# Patient Record
Sex: MALE | Race: WHITE | NOT HISPANIC OR LATINO | Employment: FULL TIME | URBAN - METROPOLITAN AREA
[De-identification: names, ages, dates, MRNs, and addresses within clinical notes are randomized per-mention and may not be internally consistent; named-entity substitution may affect disease eponyms.]

---

## 2019-07-09 ENCOUNTER — CLINICAL SUPPORT (OUTPATIENT)
Dept: URGENT CARE | Facility: CLINIC | Age: 24
End: 2019-07-09

## 2019-07-09 PROCEDURE — 99499 UNLISTED E&M SERVICE: CPT | Mod: S$GLB,,, | Performed by: EMERGENCY MEDICINE

## 2019-07-09 PROCEDURE — 99499 COAST GUARD PHYSICAL: ICD-10-PCS | Mod: S$GLB,,, | Performed by: EMERGENCY MEDICINE

## 2019-12-03 ENCOUNTER — CLINICAL SUPPORT (OUTPATIENT)
Dept: URGENT CARE | Facility: CLINIC | Age: 24
End: 2019-12-03
Payer: COMMERCIAL

## 2019-12-03 VITALS
HEART RATE: 63 BPM | DIASTOLIC BLOOD PRESSURE: 85 MMHG | RESPIRATION RATE: 16 BRPM | SYSTOLIC BLOOD PRESSURE: 145 MMHG | TEMPERATURE: 98 F | WEIGHT: 198 LBS | OXYGEN SATURATION: 98 %

## 2019-12-03 DIAGNOSIS — R50.9 FEVER, UNSPECIFIED FEVER CAUSE: Primary | ICD-10-CM

## 2019-12-03 DIAGNOSIS — J01.90 ACUTE NON-RECURRENT SINUSITIS, UNSPECIFIED LOCATION: ICD-10-CM

## 2019-12-03 DIAGNOSIS — J02.9 PHARYNGITIS, UNSPECIFIED ETIOLOGY: ICD-10-CM

## 2019-12-03 LAB
CTP QC/QA: YES
CTP QC/QA: YES
FLUAV AG NPH QL: NEGATIVE
FLUBV AG NPH QL: NEGATIVE
S PYO RRNA THROAT QL PROBE: NEGATIVE

## 2019-12-03 PROCEDURE — 96372 PR INJECTION,THERAP/PROPH/DIAG2ST, IM OR SUBCUT: ICD-10-PCS | Mod: S$GLB,,, | Performed by: NURSE PRACTITIONER

## 2019-12-03 PROCEDURE — 99214 OFFICE O/P EST MOD 30 MIN: CPT | Mod: 25,S$GLB,, | Performed by: NURSE PRACTITIONER

## 2019-12-03 PROCEDURE — 99214 PR OFFICE/OUTPT VISIT, EST, LEVL IV, 30-39 MIN: ICD-10-PCS | Mod: 25,S$GLB,, | Performed by: NURSE PRACTITIONER

## 2019-12-03 PROCEDURE — 87880 STREP A ASSAY W/OPTIC: CPT | Mod: QW,,, | Performed by: NURSE PRACTITIONER

## 2019-12-03 PROCEDURE — 96372 THER/PROPH/DIAG INJ SC/IM: CPT | Mod: S$GLB,,, | Performed by: NURSE PRACTITIONER

## 2019-12-03 PROCEDURE — 87804 INFLUENZA ASSAY W/OPTIC: CPT | Mod: QW,,, | Performed by: NURSE PRACTITIONER

## 2019-12-03 PROCEDURE — 87880 POCT RAPID STREP A: ICD-10-PCS | Mod: QW,,, | Performed by: NURSE PRACTITIONER

## 2019-12-03 PROCEDURE — 87804 POCT INFLUENZA A/B: ICD-10-PCS | Mod: 59,QW,, | Performed by: NURSE PRACTITIONER

## 2019-12-03 RX ORDER — AMOXICILLIN 875 MG/1
875 TABLET, FILM COATED ORAL 2 TIMES DAILY
Qty: 14 TABLET | Refills: 0 | Status: SHIPPED | OUTPATIENT
Start: 2019-12-03 | End: 2019-12-10

## 2019-12-03 RX ORDER — DEXAMETHASONE SODIUM PHOSPHATE 4 MG/ML
8 INJECTION, SOLUTION INTRA-ARTICULAR; INTRALESIONAL; INTRAMUSCULAR; INTRAVENOUS; SOFT TISSUE
Status: COMPLETED | OUTPATIENT
Start: 2019-12-03 | End: 2019-12-03

## 2019-12-03 RX ORDER — FLUTICASONE PROPIONATE 50 MCG
2 SPRAY, SUSPENSION (ML) NASAL DAILY
Qty: 15.8 ML | Refills: 0 | Status: SHIPPED | OUTPATIENT
Start: 2019-12-03

## 2019-12-03 RX ORDER — CETIRIZINE HYDROCHLORIDE 10 MG/1
10 TABLET ORAL DAILY
Qty: 30 TABLET | Refills: 0 | Status: SHIPPED | OUTPATIENT
Start: 2019-12-03 | End: 2023-02-03

## 2019-12-03 RX ADMIN — DEXAMETHASONE SODIUM PHOSPHATE 8 MG: 4 INJECTION, SOLUTION INTRA-ARTICULAR; INTRALESIONAL; INTRAMUSCULAR; INTRAVENOUS; SOFT TISSUE at 10:12

## 2019-12-03 NOTE — PROGRESS NOTES
Subjective: post nasal drip, stuffy nose, runny nose, sinus pressure       Patient ID: Jose Ibrahim is a 24 y.o. male.    Vitals:  weight is 89.8 kg (198 lb). His temperature is 97.7 °F (36.5 °C). His blood pressure is 145/85 (abnormal) and his pulse is 63. His respiration is 16 and oxygen saturation is 98%.     Chief Complaint: Sinus Problem (post nasal drip, runny nose, sinus pressure)    Patient complains of sore throat, fever and chills that began last night. Denies difficulty swallowing.     Sinus Problem   This is a new problem. The current episode started in the past 7 days. Associated symptoms include chills, congestion, sinus pressure and a sore throat. Pertinent negatives include no coughing, headaches or shortness of breath.       Constitution: Positive for chills and fever. Negative for fatigue.   HENT: Positive for congestion, sinus pressure and sore throat.    Neck: Negative for painful lymph nodes.   Cardiovascular: Negative for chest pain and leg swelling.   Eyes: Negative for double vision and blurred vision.   Respiratory: Negative for cough and shortness of breath.    Gastrointestinal: Negative for abdominal pain, nausea, vomiting and diarrhea.   Genitourinary: Negative for dysuria, frequency and urgency.   Musculoskeletal: Negative for joint pain, joint swelling, muscle cramps and muscle ache.   Skin: Negative for color change, pale and rash.   Allergic/Immunologic: Negative for seasonal allergies.   Neurological: Negative for dizziness, history of vertigo, light-headedness, passing out and headaches.   Hematologic/Lymphatic: Negative for swollen lymph nodes, easy bruising/bleeding and history of blood clots. Does not bruise/bleed easily.   Psychiatric/Behavioral: Negative for nervous/anxious, sleep disturbance and depression. The patient is not nervous/anxious.        Objective:      Physical Exam   Constitutional: He is oriented to person, place, and time. Vital signs are normal. He appears  well-developed and well-nourished. He is cooperative.  Non-toxic appearance. He does not have a sickly appearance. He does not appear ill. No distress.   HENT:   Head: Normocephalic and atraumatic.   Right Ear: Hearing, tympanic membrane, external ear and ear canal normal.   Left Ear: Hearing, tympanic membrane, external ear and ear canal normal.   Nose: Mucosal edema and rhinorrhea present. No nasal deformity. No epistaxis. Right sinus exhibits no maxillary sinus tenderness and no frontal sinus tenderness. Left sinus exhibits no maxillary sinus tenderness and no frontal sinus tenderness.   Mouth/Throat: Uvula is midline and mucous membranes are normal. No trismus in the jaw. Normal dentition. No uvula swelling. Posterior oropharyngeal erythema present.   Eyes: Conjunctivae and lids are normal. Right eye exhibits no discharge. Left eye exhibits no discharge. No scleral icterus.   Neck: Trachea normal, normal range of motion, full passive range of motion without pain and phonation normal. Neck supple.   Cardiovascular: Normal rate, regular rhythm, normal heart sounds, intact distal pulses and normal pulses.   Pulmonary/Chest: Effort normal and breath sounds normal. No respiratory distress.   Abdominal: Soft. Normal appearance and bowel sounds are normal. He exhibits no distension, no pulsatile midline mass and no mass. There is no tenderness.   Musculoskeletal: Normal range of motion. He exhibits no edema or deformity.   Neurological: He is alert and oriented to person, place, and time. He exhibits normal muscle tone. Coordination normal. GCS eye subscore is 4. GCS verbal subscore is 5. GCS motor subscore is 6.   Skin: Skin is warm, dry, intact, not diaphoretic and not pale.   Psychiatric: He has a normal mood and affect. His speech is normal and behavior is normal. Judgment and thought content normal. Cognition and memory are normal.   Nursing note and vitals reviewed.        Assessment:       1. Fever, unspecified  fever cause    2. Pharyngitis, unspecified etiology    3. Acute non-recurrent sinusitis, unspecified location        Plan:       Rapid strep negative. Influenza negative.       Fever, unspecified fever cause  -     POCT rapid strep A  -     POCT Influenza A/B    Pharyngitis, unspecified etiology    Acute non-recurrent sinusitis, unspecified location    Other orders  -     dexamethasone injection 8 mg  -     amoxicillin (AMOXIL) 875 MG tablet; Take 1 tablet (875 mg total) by mouth 2 (two) times daily. for 7 days  Dispense: 14 tablet; Refill: 0  -     cetirizine (ZYRTEC) 10 MG tablet; Take 1 tablet (10 mg total) by mouth once daily.  Dispense: 30 tablet; Refill: 0  -     fluticasone propionate (FLONASE) 50 mcg/actuation nasal spray; 2 sprays (100 mcg total) by Each Nostril route once daily.  Dispense: 15.8 mL; Refill: 0

## 2019-12-03 NOTE — PATIENT INSTRUCTIONS

## 2020-02-10 ENCOUNTER — OFFICE VISIT (OUTPATIENT)
Dept: ORTHOPEDICS | Facility: CLINIC | Age: 25
End: 2020-02-10
Payer: COMMERCIAL

## 2020-02-10 VITALS
HEART RATE: 76 BPM | HEIGHT: 73 IN | WEIGHT: 198 LBS | SYSTOLIC BLOOD PRESSURE: 122 MMHG | DIASTOLIC BLOOD PRESSURE: 74 MMHG | BODY MASS INDEX: 26.24 KG/M2

## 2020-02-10 DIAGNOSIS — S39.012A STRAIN OF LUMBAR REGION, INITIAL ENCOUNTER: Primary | ICD-10-CM

## 2020-02-10 DIAGNOSIS — M54.59 LUMBAR FACET JOINT PAIN: ICD-10-CM

## 2020-02-10 PROCEDURE — 3008F PR BODY MASS INDEX (BMI) DOCUMENTED: ICD-10-PCS | Mod: S$GLB,,, | Performed by: ORTHOPAEDIC SURGERY

## 2020-02-10 PROCEDURE — 99203 OFFICE O/P NEW LOW 30 MIN: CPT | Mod: 25,S$GLB,, | Performed by: ORTHOPAEDIC SURGERY

## 2020-02-10 PROCEDURE — 3008F BODY MASS INDEX DOCD: CPT | Mod: S$GLB,,, | Performed by: ORTHOPAEDIC SURGERY

## 2020-02-10 PROCEDURE — 99203 PR OFFICE/OUTPT VISIT, NEW, LEVL III, 30-44 MIN: ICD-10-PCS | Mod: 25,S$GLB,, | Performed by: ORTHOPAEDIC SURGERY

## 2020-02-10 NOTE — PROGRESS NOTES
Subjective:       Patient ID: Jose Ibrahim is a 24 y.o. male.    Chief Complaint: Pain of the Lumbar Spine (Lumbar pain for about 3 months, states he was lifting a pallet from a truck knee level when he felt a pop. He went to the chiropractor a few times but no relief. Back feels tight. ROM limited when bending over. Intermittent pain on lateral side RT hip)      History of Present Illness  [age] old man injured his lower back lifting a Pallet and has had some persistent back pain that radiates to his right buttock no bowel or bladder incontinence has seen a chiropractor takes over-the-counter anti-inflammatories.  No prior history of similar symptoms    Current Medications  Current Outpatient Medications   Medication Sig Dispense Refill    cetirizine (ZYRTEC) 10 MG tablet Take 1 tablet (10 mg total) by mouth once daily. 30 tablet 0    epinephrine (EPIPEN) 0.3 mg/0.3 mL PnIj No Sig Provided      fluticasone propionate (FLONASE) 50 mcg/actuation nasal spray 2 sprays (100 mcg total) by Each Nostril route once daily. 15.8 mL 0    mometasone (ASMANEX TWISTHALER) 220 mcg (60 doses) AePB Inhale 1 puff into the lungs once daily. 1-2 times daily 1 each 3     Current Facility-Administered Medications   Medication Dose Route Frequency Provider Last Rate Last Dose    Allergy Mix   Subcutaneous 1 time in Clinic/HOD Aurora Murphy MD        Allergy Mix   Subcutaneous 1 time in Clinic/HOD Aurora Murphy MD           Allergies  Review of patient's allergies indicates:   Allergen Reactions    No known drug allergies        Past Medical History  Past Medical History:   Diagnosis Date    Environmental allergies     RAD (reactive airway disease)     RAD (reactive airway disease)        Surgical History  Past Surgical History:   Procedure Laterality Date    CIRCUMCISION, PRIMARY         Family History:   Family History   Problem Relation Age of Onset    Allergic rhinitis Father     Allergies Sister      Angioedema Neg Hx     Asthma Neg Hx     Atopy Neg Hx     Eczema Neg Hx     Immunodeficiency Neg Hx     Urticaria Neg Hx        Social History:   Social History     Socioeconomic History    Marital status: Single     Spouse name: Not on file    Number of children: Not on file    Years of education: Not on file    Highest education level: Not on file   Occupational History    Not on file   Social Needs    Financial resource strain: Not on file    Food insecurity:     Worry: Not on file     Inability: Not on file    Transportation needs:     Medical: Not on file     Non-medical: Not on file   Tobacco Use    Smoking status: Never Smoker   Substance and Sexual Activity    Alcohol use: No    Drug use: No    Sexual activity: Not on file   Lifestyle    Physical activity:     Days per week: Not on file     Minutes per session: Not on file    Stress: Not on file   Relationships    Social connections:     Talks on phone: Not on file     Gets together: Not on file     Attends Scientology service: Not on file     Active member of club or organization: Not on file     Attends meetings of clubs or organizations: Not on file     Relationship status: Not on file   Other Topics Concern    Not on file   Social History Narrative    SOC. HX:  Lives w/ Mom, Dad, Sister. NO Smokers. + Pets -- 1 dog, 1 cat (Inside). 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        Allergic to cats dogs and dust.  Has received immune therapy to cats and dust.  Will begin receiving immune therapy to dog dander. Has cats and dogs at home but Family does not want to eliminate that exposure.       Hospitalization/Major Diagnostic Procedure:     Review of Systems     General/Constitutional:  Chills denies. Fatigue denies. Fever denies. Weight gain denies. Weight loss denies.    Respiratory:  Shortness of breath denies.    Cardiovascular:  Chest pain denies.    Gastrointestinal:  Constipation denies. Diarrhea denies. Nausea denies. Vomiting  denies.     Hematology:  Easy bruising denies. Prolonged bleeding denies.     Genitourinary:  Frequent urination denies. Pain in lower back denies. Painful urination denies.     Musculoskeletal:  See HPI for details    Skin:  Rash denies.    Neurologic:  Dizziness denies. Gait abnormalities denies. Seizures denies. Tingling/Numbess denies.    Psychiatric:  Anxiety denies. Depressed mood denies.     Objective:   Vital Signs:   Vitals:    02/10/20 0837   BP: 122/74   Pulse: 76        Physical Exam      General Examination:     Constitutional: The patient is alert and oriented to lace person and time. Mood is pleasant.     Head/Face: Normal facial features normal eyebrows    Eyes: Normal extraocular motion bilaterally    Lungs: Respirations are equal and unlabored    Gait is coordinated.    Cardiovascular: There are no swelling or varicosities present.    Lymphatic: Negative for adenopathy    Skin: Normal    Neurological: Level of consciousness normal. Oriented to place person and time and situation    Psychiatric: Oriented to time place person and situation    Moderate tenderness lumbosacral junction midline the paraspinous muscles no spasm range of motion mildly limited tenderness over the right posterior iliac spine.  Hip and knee range of motion normal straight-leg-raising negative. Neurovascular status normal.    XRAY Report/ Interpretation :  AP and lateral x-rays of lumbar spine will performed today. Indications low back pain. Findings:  X-rays performed February 10 were personally reviewed AP and lateral views appeared to be normal no signs of any occult fractures normal disc space maintenance.      Assessment:       1. Strain of lumbar region, initial encounter    2. Lumbar facet joint pain        Plan:       Jose was seen today for pain.    Diagnoses and all orders for this visit:    Strain of lumbar region, initial encounter  -     X-Ray Lumbar Spine Ap And Lateral  -     X-Ray Pelvis Routine AP    Lumbar  facet joint pain         No follow-ups on file.    Due to the duration of his symptoms which are unusual in a man his age I suggested is this point time he have an MRI study of the lumbar spine for further evaluation of his present low back pain    This note was created using Dragon voice recognition software that occasionally misinterpreted phrases or words.     No complaints

## 2020-02-14 ENCOUNTER — HOSPITAL ENCOUNTER (OUTPATIENT)
Dept: RADIOLOGY | Facility: HOSPITAL | Age: 25
Discharge: HOME | End: 2020-02-14
Attending: ORTHOPAEDIC SURGERY
Payer: COMMERCIAL

## 2020-02-14 DIAGNOSIS — M54.59 LUMBAR FACET JOINT PAIN: ICD-10-CM

## 2020-02-14 DIAGNOSIS — S39.012A STRAIN OF LUMBAR REGION, INITIAL ENCOUNTER: ICD-10-CM

## 2020-02-14 PROCEDURE — 72148 MRI LUMBAR SPINE W/O DYE: CPT | Mod: TC,PO

## 2020-02-19 ENCOUNTER — OFFICE VISIT (OUTPATIENT)
Dept: ORTHOPEDICS | Facility: CLINIC | Age: 25
End: 2020-02-19
Payer: COMMERCIAL

## 2020-02-19 VITALS
WEIGHT: 198 LBS | BODY MASS INDEX: 26.24 KG/M2 | SYSTOLIC BLOOD PRESSURE: 118 MMHG | DIASTOLIC BLOOD PRESSURE: 78 MMHG | HEIGHT: 73 IN | HEART RATE: 74 BPM

## 2020-02-19 DIAGNOSIS — M47.816 LUMBAR FACET ARTHROPATHY: ICD-10-CM

## 2020-02-19 DIAGNOSIS — M51.36 DISC DEGENERATION, LUMBAR: Primary | ICD-10-CM

## 2020-02-19 PROCEDURE — 99214 PR OFFICE/OUTPT VISIT, EST, LEVL IV, 30-39 MIN: ICD-10-PCS | Mod: S$GLB,,, | Performed by: ORTHOPAEDIC SURGERY

## 2020-02-19 PROCEDURE — 99214 OFFICE O/P EST MOD 30 MIN: CPT | Mod: S$GLB,,, | Performed by: ORTHOPAEDIC SURGERY

## 2020-02-19 PROCEDURE — 3008F PR BODY MASS INDEX (BMI) DOCUMENTED: ICD-10-PCS | Mod: S$GLB,,, | Performed by: ORTHOPAEDIC SURGERY

## 2020-02-19 PROCEDURE — 3008F BODY MASS INDEX DOCD: CPT | Mod: S$GLB,,, | Performed by: ORTHOPAEDIC SURGERY

## 2020-02-19 RX ORDER — TIZANIDINE 4 MG/1
4 TABLET ORAL EVERY 6 HOURS PRN
Qty: 90 TABLET | Refills: 2 | Status: SHIPPED | OUTPATIENT
Start: 2020-02-19 | End: 2020-05-19

## 2020-02-19 RX ORDER — MELOXICAM 15 MG/1
15 TABLET ORAL DAILY
Qty: 30 TABLET | Refills: 2 | Status: SHIPPED | OUTPATIENT
Start: 2020-02-19 | End: 2022-07-07

## 2020-02-19 NOTE — PROGRESS NOTES
Subjective:       Patient ID: Jose Ibrahim is a 24 y.o. male.    Chief Complaint: Pain of the Lumbar Spine (Lumbar MRI results, pain level is low)      History of Present Illness  Patient is here follow-up for midline lumbar lumbosacral pain with occasional radiation into the right buttock area with increased activity.  This is secondary to work injury greater than 3 months ago as previously documented.  He has an updated lumbar MRI to review.  Underwent a structured course of chiropractic treatment which did not help his symptoms    Current Medications  Current Outpatient Medications   Medication Sig Dispense Refill    epinephrine (EPIPEN) 0.3 mg/0.3 mL PnIj No Sig Provided      fluticasone propionate (FLONASE) 50 mcg/actuation nasal spray 2 sprays (100 mcg total) by Each Nostril route once daily. 15.8 mL 0    mometasone (ASMANEX TWISTHALER) 220 mcg (60 doses) AePB Inhale 1 puff into the lungs once daily. 1-2 times daily 1 each 3    cetirizine (ZYRTEC) 10 MG tablet Take 1 tablet (10 mg total) by mouth once daily. 30 tablet 0     Current Facility-Administered Medications   Medication Dose Route Frequency Provider Last Rate Last Dose    Allergy Mix   Subcutaneous 1 time in Clinic/HOD Aurora Murphy MD        Allergy Mix   Subcutaneous 1 time in Clinic/HOD Aurora Murphy MD           Allergies  Review of patient's allergies indicates:   Allergen Reactions    No known drug allergies        Past Medical History  Past Medical History:   Diagnosis Date    Environmental allergies     RAD (reactive airway disease)     RAD (reactive airway disease)        Surgical History  Past Surgical History:   Procedure Laterality Date    CIRCUMCISION, PRIMARY         Family History:   Family History   Problem Relation Age of Onset    Allergic rhinitis Father     Allergies Sister     Angioedema Neg Hx     Asthma Neg Hx     Atopy Neg Hx     Eczema Neg Hx     Immunodeficiency Neg Hx     Urticaria Neg Hx         Social History:   Social History     Socioeconomic History    Marital status: Single     Spouse name: Not on file    Number of children: Not on file    Years of education: Not on file    Highest education level: Not on file   Occupational History    Not on file   Social Needs    Financial resource strain: Not on file    Food insecurity:     Worry: Not on file     Inability: Not on file    Transportation needs:     Medical: Not on file     Non-medical: Not on file   Tobacco Use    Smoking status: Never Smoker   Substance and Sexual Activity    Alcohol use: No    Drug use: No    Sexual activity: Not on file   Lifestyle    Physical activity:     Days per week: Not on file     Minutes per session: Not on file    Stress: Not on file   Relationships    Social connections:     Talks on phone: Not on file     Gets together: Not on file     Attends [employer]an service: Not on file     Active member of club or organization: Not on file     Attends meetings of clubs or organizations: Not on file     Relationship status: Not on file   Other Topics Concern    Not on file   Social History Narrative    SOC. HX:  Lives w/ Mom, Dad, Sister. NO Smokers. + Pets -- 1 dog, 1 cat (Inside). 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.        Allergic to cats dogs and dust.  Has received immune therapy to cats and dust.  Will begin receiving immune therapy to dog dander. Has cats and dogs at home but Family does not want to eliminate that exposure.       Hospitalization/Major Diagnostic Procedure:     Review of Systems     General/Constitutional:  Chills denies. Fatigue denies. Fever denies. Weight gain denies. Weight loss denies.    Respiratory:  Shortness of breath denies.    Cardiovascular:  Chest pain denies.    Gastrointestinal:  Constipation denies. Diarrhea denies. Nausea denies. Vomiting denies.     Hematology:  Easy bruising denies. Prolonged bleeding denies.     Genitourinary:  Frequent urination denies. Pain  "in lower back denies. Painful urination denies.     Musculoskeletal:  See HPI for details    Skin:  Rash denies.    Neurologic:  Dizziness denies. Gait abnormalities denies. Seizures denies. Tingling/Numbess denies.    Psychiatric:  Anxiety denies. Depressed mood denies.     Objective:   Vital Signs:   Vitals:    02/19/20 1421   BP: 118/78   Pulse: 74        Physical Exam      General Examination:     Constitutional: The patient is alert and oriented to lace person and time. Mood is pleasant.     Head/Face: Normal facial features normal eyebrows    Eyes: Normal extraocular motion bilaterally    Lungs: Respirations are equal and unlabored    Gait is coordinated.    Cardiovascular: There are no swelling or varicosities present.    Lymphatic: Negative for adenopathy    Skin: Normal    Neurological: Level of consciousness normal. Oriented to place person and time and situation    Psychiatric: Oriented to time place person and situation    Moderate tenderness lumbosacral junction midline the paraspinous muscles no spasm range of motion mildly limited tenderness over the right posterior iliac spine.  Hip and knee range of motion normal straight-leg-raising negative. Neurovascular status normal.    XRAY Report/ Interpretation:  Lumbar MRI reviewed the patient in the office today demonstrates no significant degenerative disc disease.  However there is a small disc herniation right far lateral L4-5 and a moderate disc bulge or herniation left L5-S1 both causing some neurological compression.      Assessment:       1. Disc degeneration, lumbar    2. Lumbar facet arthropathy        Plan:       Jose was seen today for pain.    Diagnoses and all orders for this visit:    Disc degeneration, lumbar    Lumbar facet arthropathy         No follow-ups on file.  Oscar Reyna, physician's assistant served in the capacity as a "scribe" for this patient encounter  A "face to face" encounter occurred with Dr. 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  He does not do any heavy lifting at work and therefore we are okay with him returning to work.  However we do recommend bilateral L4-5 and L5-S1 nerve root transforaminal ALPESH x2.  Prescription for Mobic and Zanaflex to take as needed. Follow up in 6-8 weeks and if he continues with the pain unchanged from the epidurals in recommend lumbar medial branch blocks with progression towards rhizotomy.    This note was created using Dragon voice recognition software that occasionally misinterpreted phrases or words.

## 2020-09-10 ENCOUNTER — OFFICE VISIT (OUTPATIENT)
Dept: ORTHOPEDICS | Facility: CLINIC | Age: 25
End: 2020-09-10
Payer: COMMERCIAL

## 2020-09-10 ENCOUNTER — TELEPHONE (OUTPATIENT)
Dept: RADIOLOGY | Facility: CLINIC | Age: 25
End: 2020-09-10

## 2020-09-10 VITALS — DIASTOLIC BLOOD PRESSURE: 67 MMHG | SYSTOLIC BLOOD PRESSURE: 144 MMHG | BODY MASS INDEX: 27.05 KG/M2 | WEIGHT: 205 LBS

## 2020-09-10 DIAGNOSIS — S43.432A GLENOID LABRAL TEAR, LEFT, INITIAL ENCOUNTER: Primary | ICD-10-CM

## 2020-09-10 PROCEDURE — 99213 PR OFFICE/OUTPT VISIT, EST, LEVL III, 20-29 MIN: ICD-10-PCS | Mod: S$GLB,,, | Performed by: ORTHOPAEDIC SURGERY

## 2020-09-10 PROCEDURE — 3008F BODY MASS INDEX DOCD: CPT | Mod: S$GLB,,, | Performed by: ORTHOPAEDIC SURGERY

## 2020-09-10 PROCEDURE — 3008F PR BODY MASS INDEX (BMI) DOCUMENTED: ICD-10-PCS | Mod: S$GLB,,, | Performed by: ORTHOPAEDIC SURGERY

## 2020-09-10 PROCEDURE — 99213 OFFICE O/P EST LOW 20 MIN: CPT | Mod: S$GLB,,, | Performed by: ORTHOPAEDIC SURGERY

## 2020-09-10 RX ORDER — IBUPROFEN 200 MG
200 TABLET ORAL EVERY 6 HOURS PRN
COMMUNITY
End: 2023-02-02

## 2020-09-10 NOTE — PROGRESS NOTES
[facility]    Subjective:     Chief Complaint:   Chief Complaint   Patient presents with    Left Shoulder - Pain     Left shoulder pain x 3 days. He was lifting weights and felt and heard a loud pop and has not been able to lift his arm or do anything since       Past Medical History:   Diagnosis Date    Environmental allergies     RAD (reactive airway disease)     RAD (reactive airway disease)        Past Surgical History:   Procedure Laterality Date    CIRCUMCISION, PRIMARY         Current Outpatient Medications   Medication Sig    epinephrine (EPIPEN) 0.3 mg/0.3 mL PnIj No Sig Provided    fluticasone propionate (FLONASE) 50 mcg/actuation nasal spray 2 sprays (100 mcg total) by Each Nostril route once daily.    ibuprofen (ADVIL,MOTRIN) 200 MG tablet Take 200 mg by mouth every 6 (six) hours as needed for Pain.    meloxicam (MOBIC) 15 MG tablet Take 1 tablet (15 mg total) by mouth once daily.    mometasone (ASMANEX TWISTHALER) 220 mcg (60 doses) AePB Inhale 1 puff into the lungs once daily. 1-2 times daily    cetirizine (ZYRTEC) 10 MG tablet Take 1 tablet (10 mg total) by mouth once daily.     Current Facility-Administered Medications   Medication    Allergy Mix    Allergy Mix       Review of patient's allergies indicates:   Allergen Reactions    No known drug allergies        Family History   Problem Relation Age of Onset    Allergic rhinitis Father     Allergies Sister     Angioedema Neg Hx     Asthma Neg Hx     Atopy Neg Hx     Eczema Neg Hx     Immunodeficiency Neg Hx     Urticaria Neg Hx        Social History     Socioeconomic History    Marital status: Single     Spouse name: Not on file    Number of children: Not on file    Years of education: Not on file    Highest education level: Not on file   Occupational History    Not on file   Social Needs    Financial resource strain: Not on file    Food insecurity     Worry: Not on file     Inability: Not on file     Transportation needs     Medical: Not on file     Non-medical: Not on file   Tobacco Use    Smoking status: Never Smoker   Substance and Sexual Activity    Alcohol use: No    Drug use: No    Sexual activity: Not on file   Lifestyle    Physical activity     Days per week: Not on file     Minutes per session: Not on file    Stress: Not on file   Relationships    Social connections     Talks on phone: Not on file     Gets together: Not on file     Attends Baptist service: Not on file     Active member of club or organization: Not on file     Attends meetings of clubs or organizations: Not on file     Relationship status: Not on file   Other Topics Concern    Not on file   Social History Narrative    SOC. HX:  Lives w/ Mom, Dad, Sister. NO Smokers. + Pets -- 1 dog, 1 cat (Inside). 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        Allergic to cats dogs and dust.  Has received immune therapy to cats and dust.  Will begin receiving immune therapy to dog dander. Has cats and dogs at home but Family does not want to eliminate that exposure.       History of present illness:  Patient comes in today for the left shoulder.  He was weightlifting when he felt a pop in his shoulder.  He has arm was in the abducted flexed position when this took place.  He did not require any relocation.  He does not feel like he dislocated his shoulder.  He is having severe pain is unable to move his arm.      Review of Systems:    Constitution: Negative for chills, fever, and sweats.  Negative for unexplained weight loss.    HENT:  Negative for headaches and blurry vision.    Cardiovascular:Negative for chest pain or irregular heart beat. Negative for hypertension.    Respiratory:  Negative for cough and shortness of breath.    Gastrointestinal: Negative for abdominal pain, heartburn, melena, nausea, and vomitting.    Genitourinary:  Negative bladder incontinence and dysuria.    Musculoskeletal:  See HPI for details.     Neurological:  Negative for numbness.    Psychiatric/Behavioral: Negative for depression.  The patient is not nervous/anxious.      Endocrine: Negative for polyuria    Hematologic/Lymphatic: Negative for bleeding problem.  Does not bruise/bleed easily.    Skin: Negative for poor would healing and rash    Objective:      Physical Examination:    Vital Signs:    Vitals:    09/10/20 0800   BP: (!) 144/67       Body mass index is 27.05 kg/m².    This a well-developed, well nourished patient in no acute distress.  They are alert and oriented and cooperative to examination.        Patient is very guarding.  He is unable really to move the left arm  Pertinent New Results:  .  MRI of the left shoulder demonstrates a partial tear of the inferior glenohumeral ligament  XRAY Report / Interpretation:   AP and lateral of the left shoulder demonstrates a type 2 acromion no fractures or subluxations    Assessment/Plan:      Left shoulder dislocation with relocation.  I have started him on physical therapy for range of motion.  Because the glenohumeral ligament is still intact and only partially torn he should not require surgical intervention.  In the interim he is unable to work.  I will see him back in 3 weeks      This note was created using Dragon voice recognition software that occasionally misinterpreted phrases or words.

## 2020-09-11 ENCOUNTER — TELEPHONE (OUTPATIENT)
Dept: ORTHOPEDICS | Facility: CLINIC | Age: 25
End: 2020-09-11

## 2020-09-11 NOTE — TELEPHONE ENCOUNTER
----- Message from Estrellita Rahman sent at 9/11/2020 11:24 AM CDT -----  [phone] Patient is calling stating his RX was never called into the pharmacy. Dr Spicer Phone #: [phone]        Pharmacy:   [id number] - [place], [place] - [address] [address]  Phone: [phone] Fax: [phone]

## 2020-09-11 NOTE — TELEPHONE ENCOUNTER
Spoke with pt, he states Dr. Spicer told him about some oral steroids. Nothing sent in. I will check with Dr. Spicer next week and get back with him

## 2021-04-26 ENCOUNTER — CLINICAL SUPPORT (OUTPATIENT)
Dept: URGENT CARE | Facility: CLINIC | Age: 26
End: 2021-04-26

## 2021-04-26 PROCEDURE — 99499 UNLISTED E&M SERVICE: CPT | Mod: S$GLB,,, | Performed by: EMERGENCY MEDICINE

## 2021-04-26 PROCEDURE — 99499 COAST GUARD PHYSICAL: ICD-10-PCS | Mod: S$GLB,,, | Performed by: EMERGENCY MEDICINE

## 2022-06-24 ENCOUNTER — OFFICE VISIT (OUTPATIENT)
Dept: URGENT CARE | Facility: CLINIC | Age: 27
End: 2022-06-24
Payer: COMMERCIAL

## 2022-06-24 VITALS
HEART RATE: 68 BPM | WEIGHT: 198 LBS | BODY MASS INDEX: 25.41 KG/M2 | OXYGEN SATURATION: 99 % | HEIGHT: 74 IN | RESPIRATION RATE: 16 BRPM | DIASTOLIC BLOOD PRESSURE: 71 MMHG | SYSTOLIC BLOOD PRESSURE: 118 MMHG | TEMPERATURE: 98 F

## 2022-06-24 DIAGNOSIS — G47.26 CIRCADIAN RHYTHM SLEEP DISORDER, SHIFT WORK TYPE: ICD-10-CM

## 2022-06-24 DIAGNOSIS — Z87.898 HISTORY OF PALPITATIONS: Primary | ICD-10-CM

## 2022-06-24 PROCEDURE — 99214 PR OFFICE/OUTPT VISIT, EST, LEVL IV, 30-39 MIN: ICD-10-PCS | Mod: 25,S$GLB,, | Performed by: NURSE PRACTITIONER

## 2022-06-24 PROCEDURE — 3078F PR MOST RECENT DIASTOLIC BLOOD PRESSURE < 80 MM HG: ICD-10-PCS | Mod: CPTII,S$GLB,, | Performed by: NURSE PRACTITIONER

## 2022-06-24 PROCEDURE — 93000 PR ELECTROCARDIOGRAM, COMPLETE: ICD-10-PCS | Mod: S$GLB,,, | Performed by: NURSE PRACTITIONER

## 2022-06-24 PROCEDURE — 93000 ELECTROCARDIOGRAM COMPLETE: CPT | Mod: S$GLB,,, | Performed by: NURSE PRACTITIONER

## 2022-06-24 PROCEDURE — 3074F SYST BP LT 130 MM HG: CPT | Mod: CPTII,S$GLB,, | Performed by: NURSE PRACTITIONER

## 2022-06-24 PROCEDURE — 3078F DIAST BP <80 MM HG: CPT | Mod: CPTII,S$GLB,, | Performed by: NURSE PRACTITIONER

## 2022-06-24 PROCEDURE — 1159F PR MEDICATION LIST DOCUMENTED IN MEDICAL RECORD: ICD-10-PCS | Mod: CPTII,S$GLB,, | Performed by: NURSE PRACTITIONER

## 2022-06-24 PROCEDURE — 3008F PR BODY MASS INDEX (BMI) DOCUMENTED: ICD-10-PCS | Mod: CPTII,S$GLB,, | Performed by: NURSE PRACTITIONER

## 2022-06-24 PROCEDURE — 3008F BODY MASS INDEX DOCD: CPT | Mod: CPTII,S$GLB,, | Performed by: NURSE PRACTITIONER

## 2022-06-24 PROCEDURE — 99214 OFFICE O/P EST MOD 30 MIN: CPT | Mod: 25,S$GLB,, | Performed by: NURSE PRACTITIONER

## 2022-06-24 PROCEDURE — 1160F RVW MEDS BY RX/DR IN RCRD: CPT | Mod: CPTII,S$GLB,, | Performed by: NURSE PRACTITIONER

## 2022-06-24 PROCEDURE — 3074F PR MOST RECENT SYSTOLIC BLOOD PRESSURE < 130 MM HG: ICD-10-PCS | Mod: CPTII,S$GLB,, | Performed by: NURSE PRACTITIONER

## 2022-06-24 PROCEDURE — 1160F PR REVIEW ALL MEDS BY PRESCRIBER/CLIN PHARMACIST DOCUMENTED: ICD-10-PCS | Mod: CPTII,S$GLB,, | Performed by: NURSE PRACTITIONER

## 2022-06-24 PROCEDURE — 1159F MED LIST DOCD IN RCRD: CPT | Mod: CPTII,S$GLB,, | Performed by: NURSE PRACTITIONER

## 2022-06-24 NOTE — PATIENT INSTRUCTIONS
Avoid sources of caffeine such as energy drinks, coffee, tea, sodas, and pre-workout drinks  Increase clear fluid intake and hydration  Get adequate rest and enough sleep  Follow up with cardiology for further evaluation  Go to the ER for any episode of palpitations with chest pain, shortness of breath, dizziness or passing out.  Return to clinic for any concern

## 2022-06-24 NOTE — PROGRESS NOTES
"Subjective:       Patient ID: Jose Ibrahim is a 27 y.o. male.    Vitals:  height is 6' 2" (1.88 m) and weight is 89.8 kg (198 lb). His oral temperature is 97.8 °F (36.6 °C). His blood pressure is 118/71 and his pulse is 68. His respiration is 16 and oxygen saturation is 99%.     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  After this change over  he only had a few hours sleep and states that the next day he was drinking a lot of coffee and energy drinks to stay awake and alert when he began to have palpitations.  He reports that evening the episodes became so severe he became dizzy had left lateral chest pain with nausea.  He reports becoming extremely anxious and developing numbness and tingling in his fingers.  He was evaluated by the ship paramedic and he reports having a normal EKG and vital signs.  He stayed aboard the ship to complete his work requirements and came home yesterday.  He is  asymptomatic today and is requesting a full cardiac checkup.    Palpitations   This is a new problem. Episode onset: 5 days ago. The problem occurs intermittently. The problem has been gradually worsening. Associated symptoms include chest fullness, chest pain (resolved), dizziness (resolved), numbness (resolved), shortness of breath (resolved) and weakness. Pertinent negatives include no anxiety, coughing, fever, nausea or vomiting. He has tried nothing for the symptoms.       Constitution: Negative for chills and fever.   HENT: Negative for congestion, sinus pressure and sore throat.    Neck: Negative for neck pain, neck stiffness, painful lymph nodes and neck " swelling.   Cardiovascular: Positive for chest pain (resolved), palpitations (resolved) and sob on exertion (resolved).   Respiratory: Positive for chest tightness (resolved) and shortness of breath (resolved). Negative for cough.    Gastrointestinal: Negative for abdominal pain, nausea, vomiting, diarrhea, bright red blood in stool and dark colored stools.   Genitourinary: Negative for dysuria, flank pain and hematuria.   Musculoskeletal: Negative for muscle ache.   Skin: Negative for wound, erythema and bruising.   Neurological: Positive for dizziness (resolved), light-headedness (resolved), passing out (resolved), numbness (resolved) and tingling (resolved). Negative for disorientation and altered mental status.   Hematologic/Lymphatic: Negative for swollen lymph nodes and easy bruising/bleeding. Does not bruise/bleed easily.   Psychiatric/Behavioral: Negative for altered mental status, disorientation and nervous/anxious. The patient is not nervous/anxious.        Objective:      Physical Exam   Constitutional: He is oriented to person, place, and time. He appears well-developed. He is cooperative.  Non-toxic appearance. He does not appear ill. No distress.   HENT:   Head: Normocephalic and atraumatic.   Ears:   Right Ear: Hearing and external ear normal.   Left Ear: Hearing and external ear normal.   Nose: Nose normal. No mucosal edema, rhinorrhea or nasal deformity. No epistaxis. Right sinus exhibits no maxillary sinus tenderness and no frontal sinus tenderness. Left sinus exhibits no maxillary sinus tenderness and no frontal sinus tenderness.   Mouth/Throat: Uvula is midline, oropharynx is clear and moist and mucous membranes are normal. Mucous membranes are moist. No trismus in the jaw. Normal dentition. No uvula swelling. No posterior oropharyngeal erythema. Oropharynx is clear.   Eyes: Conjunctivae and lids are normal. Right eye exhibits no discharge. Left eye exhibits no discharge. No scleral icterus.    Neck: Trachea normal and phonation normal. Neck supple.   Cardiovascular: Normal rate, regular rhythm, normal heart sounds and normal pulses. PMI is not displaced.   No murmur heard.  Pulses:       Carotid pulses are 2+ on the right side and 2+ on the left side.       Radial pulses are 2+ on the right side and 2+ on the left side. Exam reveals no gallop and no friction rub. No thrill     Comments: EKG shows normal sinus rhythm without ST elevation or ectopic beats. Small BBB noted in lead V3.   Pulmonary/Chest: Effort normal and breath sounds normal. No respiratory distress.   Abdominal: Normal appearance and bowel sounds are normal. He exhibits no distension, no pulsatile midline mass and no mass. Soft. There is no abdominal tenderness. There is no rebound and no guarding. No hernia.   Musculoskeletal: Normal range of motion.         General: No deformity. Normal range of motion.      Right lower leg: No edema.      Left lower leg: No edema.   Neurological: no focal deficit. He is alert, oriented to person, place, and time and at baseline. He displays no weakness. No sensory deficit. He exhibits normal muscle tone. Coordination normal.   Skin: Skin is warm, dry, intact, not diaphoretic and not pale. Capillary refill takes 2 to 3 seconds. No erythema   Psychiatric: His speech is normal and behavior is normal. Judgment and thought content normal.   Nursing note and vitals reviewed.        Assessment:       1. History of palpitations    2. Circadian rhythm sleep disorder, shift work type          Plan:         History of palpitations  -     IN OFFICE EKG 12-LEAD (to [facility])    Circadian rhythm sleep disorder, shift work type    I have discussed the physical exam findings, ekg results, and diagnosis with the patient.  He is asymptomatic today.  He reports cessation of all caffeine containing foods and fluids since his arrival back home.  He denies any palpitations or chest pain today.  I have discussed with him at  length shift work syndrome and caffeine intake and how it may cause palpitations.  We also discussed the inability of this clinic to perform a full cardiac workup and the need for cardiology referral.  We discussed symptom monitoring and strict ER precautions.  He verbalized understanding and agreement with this plan of care           Avoid sources of caffeine such as energy drinks, coffee, tea, sodas, and pre-workout drinks  Increase clear fluid intake and hydration  Get adequate rest and enough sleep  Follow up with cardiology for further evaluation  Go to the ER for any episode of palpitations with chest pain, shortness of breath, dizziness or passing out.  Return to clinic for any concern     Pt. Called office to state no call by cardiology yet. Stat referral re-entered today. 07/12/2022-1145hrs.

## 2022-07-07 ENCOUNTER — OFFICE VISIT (OUTPATIENT)
Dept: ORTHOPEDICS | Facility: CLINIC | Age: 27
End: 2022-07-07
Payer: COMMERCIAL

## 2022-07-07 VITALS — BODY MASS INDEX: 25.67 KG/M2 | HEIGHT: 74 IN | WEIGHT: 200 LBS

## 2022-07-07 DIAGNOSIS — S83.281A ACUTE LATERAL MENISCUS TEAR OF RIGHT KNEE, INITIAL ENCOUNTER: ICD-10-CM

## 2022-07-07 DIAGNOSIS — M22.41 CHONDROMALACIA OF RIGHT PATELLA: Primary | ICD-10-CM

## 2022-07-07 PROCEDURE — 99213 PR OFFICE/OUTPT VISIT, EST, LEVL III, 20-29 MIN: ICD-10-PCS | Mod: S$GLB,,, | Performed by: ORTHOPAEDIC SURGERY

## 2022-07-07 PROCEDURE — 3008F PR BODY MASS INDEX (BMI) DOCUMENTED: ICD-10-PCS | Mod: CPTII,S$GLB,, | Performed by: ORTHOPAEDIC SURGERY

## 2022-07-07 PROCEDURE — 3008F BODY MASS INDEX DOCD: CPT | Mod: CPTII,S$GLB,, | Performed by: ORTHOPAEDIC SURGERY

## 2022-07-07 PROCEDURE — 1160F RVW MEDS BY RX/DR IN RCRD: CPT | Mod: CPTII,S$GLB,, | Performed by: ORTHOPAEDIC SURGERY

## 2022-07-07 PROCEDURE — 99213 OFFICE O/P EST LOW 20 MIN: CPT | Mod: S$GLB,,, | Performed by: ORTHOPAEDIC SURGERY

## 2022-07-07 PROCEDURE — 1159F MED LIST DOCD IN RCRD: CPT | Mod: CPTII,S$GLB,, | Performed by: ORTHOPAEDIC SURGERY

## 2022-07-07 PROCEDURE — 1160F PR REVIEW ALL MEDS BY PRESCRIBER/CLIN PHARMACIST DOCUMENTED: ICD-10-PCS | Mod: CPTII,S$GLB,, | Performed by: ORTHOPAEDIC SURGERY

## 2022-07-07 PROCEDURE — 1159F PR MEDICATION LIST DOCUMENTED IN MEDICAL RECORD: ICD-10-PCS | Mod: CPTII,S$GLB,, | Performed by: ORTHOPAEDIC SURGERY

## 2022-07-07 RX ORDER — LORATADINE 10 MG/1
10 TABLET ORAL DAILY
COMMUNITY

## 2022-07-07 NOTE — PROGRESS NOTES
[facility]    Subjective:     Chief Complaint:   Chief Complaint   Patient presents with    Right Knee - Pain     Right knee pain intermittently about a year, pain with physical exertion, swelling with exercising, throbbing, popping, no giving way, PT at [facility] since last week, had consult only         Past Medical History:   Diagnosis Date    Environmental allergies     RAD (reactive airway disease)     RAD (reactive airway disease)        Past Surgical History:   Procedure Laterality Date    CIRCUMCISION, PRIMARY         Current Outpatient Medications   Medication Sig    fluticasone propionate (FLONASE) 50 mcg/actuation nasal spray 2 sprays (100 mcg total) by Each Nostril route once daily.    ibuprofen (ADVIL,MOTRIN) 200 MG tablet Take 200 mg by mouth every 6 (six) hours as needed for Pain.    loratadine (CLARITIN) 10 mg tablet Take 10 mg by mouth once daily.    cetirizine (ZYRTEC) 10 MG tablet Take 1 tablet (10 mg total) by mouth once daily.    EPINEPHrine (EPIPEN) 0.3 mg/0.3 mL AtIn No Sig Provided    meloxicam (MOBIC) 15 MG tablet Take 1 tablet (15 mg total) by mouth once daily. (Patient not taking: No sig reported)    mometasone (ASMANEX TWISTHALER) 220 mcg (60 doses) AePB Inhale 1 puff into the lungs once daily. 1-2 times daily (Patient not taking: No sig reported)     Current Facility-Administered Medications   Medication    Allergy Mix    Allergy Mix       Review of patient's allergies indicates:   Allergen Reactions    No known drug allergies        Family History   Problem Relation Age of Onset    Allergic rhinitis Father     Allergies Sister     Angioedema Neg Hx     Asthma Neg Hx     Atopy Neg Hx     Eczema Neg Hx     Immunodeficiency Neg Hx     Urticaria Neg Hx        Social History     Socioeconomic History    Marital status: Single   Tobacco Use    Smoking status: Never Smoker   Substance and Sexual Activity    Alcohol use: No    Drug use: No   Social  History Narrative    SOC. HX:  Lives w/ Mom, Dad, Sister. NO Smokers. + Pets -- 1 dog, 1 cat (Inside). 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        Allergic to cats dogs and dust.  Has received immune therapy to cats and dust.  Will begin receiving immune therapy to dog dander. Has cats and dogs at home but Family does not want to eliminate that exposure.       History of present illness:  27-year-old male who presents to clinic today for evaluation of right knee pain.  Patient has had constant chronic achy pain in the right knee since a snowboarding accident last year.  He runs cross-country, he works out regularly.  His pain is primarily over the medial aspect of the knee and he described a valgus stress to the knee at the time of the injury.      Review of Systems:    Constitution: Negative for chills, fever, and sweats.  Negative for unexplained weight loss.    HENT:  Negative for headaches and blurry vision.    Cardiovascular:Negative for chest pain or irregular heart beat. Negative for hypertension.    Respiratory:  Negative for cough and shortness of breath.    Gastrointestinal: Negative for abdominal pain, heartburn, melena, nausea, and vomitting.    Genitourinary:  Negative bladder incontinence and dysuria.    Musculoskeletal:  See HPI for details.     Neurological: Negative for numbness.    Psychiatric/Behavioral: Negative for depression.  The patient is not nervous/anxious.      Endocrine: Negative for polyuria    Hematologic/Lymphatic: Negative for bleeding problem.  Does not bruise/bleed easily.    Skin: Negative for poor would healing and rash    Objective:      Physical Examination:    Vital Signs:  There were no vitals filed for this visit.    Body mass index is 25.68 kg/m².    This a well-developed, well nourished patient in no acute distress.  They are alert and oriented and cooperative to examination.        Examination of the right knee, no effusion, no patellofemoral crepitus, no  "significant pain with grind testing.  Medial joint line pain or tenderness is noted on my exam. pain with Varghese's testing medially and laterally but no click or pop is noted.  Knee feels stable to anterior posterior varus and valgus stresses.  Lachman's is negative, drawer testing was negative.    Pertinent New Results:    XRAY Report / Interpretation:   AP lateral sunrise views of the right knee taken today in the office did not demonstrate any osseous abnormalities.    Assessment/Plan:      Right knee pain, suspicious for a mechanism of injury and internal derangement.  No ligamentous instability is appreciated on exam, suspicious for meniscal injury.  We will order MRI of the right knee to further evaluate.  See him back with that information.    Rodri Edge, Physician Assistant, served in the capacity as a "scribe" for this patient encounter.  A "face-to-face" encounter occurred with Dr. Erick Spicer on this date.  The treatment plan and medical decision-making is outlined above. Patient was seen and examined with a chaperone.       This note was created using Dragon voice recognition software that occasionally misinterpreted phrases or words.        "

## 2022-07-14 ENCOUNTER — TELEPHONE (OUTPATIENT)
Dept: ORTHOPEDICS | Facility: CLINIC | Age: 27
End: 2022-07-14

## 2022-07-14 DIAGNOSIS — M22.41 CHONDROMALACIA OF RIGHT PATELLA: Primary | ICD-10-CM

## 2022-07-14 NOTE — TELEPHONE ENCOUNTER
----- Message from Ana Bender sent at 7/13/2022  9:23 AM CDT -----  Regarding: PT Referral  Contact: Yazmin from [facility]  She is requesting an order be sent over, the patient has the original order but is requesting we send one as well.

## 2022-07-19 ENCOUNTER — TELEPHONE (OUTPATIENT)
Dept: ORTHOPEDICS | Facility: CLINIC | Age: 27
End: 2022-07-19

## 2022-07-19 NOTE — TELEPHONE ENCOUNTER
----- Message from TONY Cat sent at 7/14/2022  9:22 AM CDT -----  Regarding: PT referral    ----- Message -----  From: Nat Castro  Sent: 7/14/2022   8:38 AM CDT  To: TONY Cat    F#-[phone]-Annabel with [employer]-needs a SIGNED PT referral faxed please

## 2022-08-12 ENCOUNTER — HOSPITAL ENCOUNTER (OUTPATIENT)
Dept: RADIOLOGY | Facility: HOSPITAL | Age: 27
Discharge: HOME | End: 2022-08-12
Attending: ORTHOPAEDIC SURGERY
Payer: COMMERCIAL

## 2022-08-12 DIAGNOSIS — S83.281A ACUTE LATERAL MENISCUS TEAR OF RIGHT KNEE, INITIAL ENCOUNTER: ICD-10-CM

## 2022-08-12 PROCEDURE — 73721 MRI JNT OF LWR EXTRE W/O DYE: CPT | Mod: TC,PO,RT

## 2022-08-23 ENCOUNTER — OFFICE VISIT (OUTPATIENT)
Dept: CARDIOLOGY | Facility: CLINIC | Age: 27
End: 2022-08-23
Payer: COMMERCIAL

## 2022-08-23 VITALS
WEIGHT: 201.06 LBS | HEART RATE: 69 BPM | DIASTOLIC BLOOD PRESSURE: 79 MMHG | BODY MASS INDEX: 25.8 KG/M2 | HEIGHT: 74 IN | SYSTOLIC BLOOD PRESSURE: 121 MMHG

## 2022-08-23 DIAGNOSIS — Z29.89 IMMUNOTHERAPY: Primary | ICD-10-CM

## 2022-08-23 DIAGNOSIS — R00.2 PALPITATIONS: ICD-10-CM

## 2022-08-23 PROCEDURE — 1160F PR REVIEW ALL MEDS BY PRESCRIBER/CLIN PHARMACIST DOCUMENTED: ICD-10-PCS | Mod: CPTII,S$GLB,, | Performed by: INTERNAL MEDICINE

## 2022-08-23 PROCEDURE — 3008F BODY MASS INDEX DOCD: CPT | Mod: CPTII,S$GLB,, | Performed by: INTERNAL MEDICINE

## 2022-08-23 PROCEDURE — 3074F PR MOST RECENT SYSTOLIC BLOOD PRESSURE < 130 MM HG: ICD-10-PCS | Mod: CPTII,S$GLB,, | Performed by: INTERNAL MEDICINE

## 2022-08-23 PROCEDURE — 93005 ELECTROCARDIOGRAM TRACING: CPT | Mod: PO

## 2022-08-23 PROCEDURE — 99999 PR PBB SHADOW E&M-EST. PATIENT-LVL II: ICD-10-PCS | Mod: PBBFAC,,, | Performed by: INTERNAL MEDICINE

## 2022-08-23 PROCEDURE — 93010 ELECTROCARDIOGRAM REPORT: CPT | Mod: S$GLB,,, | Performed by: INTERNAL MEDICINE

## 2022-08-23 PROCEDURE — 3074F SYST BP LT 130 MM HG: CPT | Mod: CPTII,S$GLB,, | Performed by: INTERNAL MEDICINE

## 2022-08-23 PROCEDURE — 93010 EKG 12-LEAD: ICD-10-PCS | Mod: S$GLB,,, | Performed by: INTERNAL MEDICINE

## 2022-08-23 PROCEDURE — 1160F RVW MEDS BY RX/DR IN RCRD: CPT | Mod: CPTII,S$GLB,, | Performed by: INTERNAL MEDICINE

## 2022-08-23 PROCEDURE — 99203 PR OFFICE/OUTPT VISIT, NEW, LEVL III, 30-44 MIN: ICD-10-PCS | Mod: S$GLB,,, | Performed by: INTERNAL MEDICINE

## 2022-08-23 PROCEDURE — 1159F MED LIST DOCD IN RCRD: CPT | Mod: CPTII,S$GLB,, | Performed by: INTERNAL MEDICINE

## 2022-08-23 PROCEDURE — 99999 PR PBB SHADOW E&M-EST. PATIENT-LVL II: CPT | Mod: PBBFAC,,, | Performed by: INTERNAL MEDICINE

## 2022-08-23 PROCEDURE — 3078F PR MOST RECENT DIASTOLIC BLOOD PRESSURE < 80 MM HG: ICD-10-PCS | Mod: CPTII,S$GLB,, | Performed by: INTERNAL MEDICINE

## 2022-08-23 PROCEDURE — 3008F PR BODY MASS INDEX (BMI) DOCUMENTED: ICD-10-PCS | Mod: CPTII,S$GLB,, | Performed by: INTERNAL MEDICINE

## 2022-08-23 PROCEDURE — 1159F PR MEDICATION LIST DOCUMENTED IN MEDICAL RECORD: ICD-10-PCS | Mod: CPTII,S$GLB,, | Performed by: INTERNAL MEDICINE

## 2022-08-23 PROCEDURE — 3078F DIAST BP <80 MM HG: CPT | Mod: CPTII,S$GLB,, | Performed by: INTERNAL MEDICINE

## 2022-08-23 PROCEDURE — 99203 OFFICE O/P NEW LOW 30 MIN: CPT | Mod: S$GLB,,, | Performed by: INTERNAL MEDICINE

## 2022-08-23 NOTE — PROGRESS NOTES
"Subjective:    Patient ID:  Jose Ibrahim is a 27 y.o. male who presents for evaluation of No chief complaint on file.      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 This lasted several days when offshore but has resolved and no problems in last five weeks. They did an EKG at his work that was normal and EKG today normal.     Review of Systems   Cardiovascular: Positive for irregular heartbeat and palpitations. Negative for chest pain, claudication, cyanosis, dyspnea on exertion, leg swelling, near-syncope, orthopnea, paroxysmal nocturnal dyspnea and syncope.        Objective:    Physical Exam  Constitutional:       General: He is not in acute distress.     Appearance: He is well-developed. He is not diaphoretic.      Comments: /79 (BP Location: Left arm, Patient Position: Sitting, BP Method: Large (Automatic))   Pulse 69   Ht 6' 2" (1.88 m)   Wt 91.2 kg (201 lb 1 oz)   BMI 25.81 kg/m²      HENT:      Head: Normocephalic and atraumatic.   Eyes:      General: Lids are normal. No scleral icterus.        Right eye: No discharge.      Conjunctiva/sclera: Conjunctivae normal.      Pupils: Pupils are equal, round, and reactive to light.   Neck:      Thyroid: No thyromegaly.      Vascular: No JVD.      Trachea: No tracheal deviation.   Cardiovascular:      Rate and Rhythm: Normal rate and regular rhythm.      Pulses: Intact distal pulses.           Carotid pulses are 2+ on the right side and 2+ on the left side.       Radial pulses are 2+ on the right side and 2+ on the left side.        Femoral pulses are 2+ on the right side and 2+ on the left side.       Popliteal pulses are 2+ on the right side and 2+ on the left side.        Dorsalis pedis pulses are 2+ on the right side and 2+ on the left side.        Posterior tibial pulses are 2+ on the right side and 2+ on the left side.      Heart sounds: Normal heart sounds, S1 normal and S2 normal. No " murmur heard.    No friction rub. No gallop.   Pulmonary:      Effort: Pulmonary effort is normal. No respiratory distress.      Breath sounds: Normal breath sounds. No wheezing or rales.   Chest:      Chest wall: No tenderness.   Abdominal:      General: Bowel sounds are normal. There is no distension.      Palpations: Abdomen is soft. There is no hepatomegaly or mass.      Tenderness: There is no abdominal tenderness. There is no guarding or rebound.   Musculoskeletal:         General: No tenderness. Normal range of motion.      Cervical back: Normal range of motion and neck supple.   Lymphadenopathy:      Cervical: No cervical adenopathy.   Skin:     General: Skin is warm and dry.      Coloration: Skin is not pale.      Findings: No erythema or rash.   Neurological:      Mental Status: He is alert and oriented to person, place, and time.      Cranial Nerves: No cranial nerve deficit.      Coordination: Coordination normal.      Deep Tendon Reflexes: Reflexes are normal and symmetric.   Psychiatric:         Speech: Speech normal.         Behavior: Behavior normal.         Thought Content: Thought content normal.         Judgment: Judgment normal.           Assessment:       1. Immunotherapy    2. Palpitations         Plan:     Patient advised to limit exposure to caffeine, stimulants, and alcohol     Suggested he get a [employer] device he could keep with him and record his EKG if has more symptoms. [facility]      Orders Placed This Encounter   Procedures    IN OFFICE EKG 12-LEAD (to [facility])     Follow up in about 1 year (around 8/23/2023).

## 2023-02-02 ENCOUNTER — OFFICE VISIT (OUTPATIENT)
Dept: PULMONOLOGY | Facility: CLINIC | Age: 28
End: 2023-02-02
Payer: COMMERCIAL

## 2023-02-02 ENCOUNTER — TELEPHONE (OUTPATIENT)
Dept: PULMONOLOGY | Facility: CLINIC | Age: 28
End: 2023-02-02
Payer: COMMERCIAL

## 2023-02-02 VITALS
WEIGHT: 206.44 LBS | HEART RATE: 67 BPM | OXYGEN SATURATION: 99 % | DIASTOLIC BLOOD PRESSURE: 73 MMHG | BODY MASS INDEX: 26.49 KG/M2 | HEIGHT: 74 IN | SYSTOLIC BLOOD PRESSURE: 141 MMHG

## 2023-02-02 DIAGNOSIS — R07.9 CHEST PAIN, UNSPECIFIED TYPE: ICD-10-CM

## 2023-02-02 DIAGNOSIS — R06.02 SOB (SHORTNESS OF BREATH): ICD-10-CM

## 2023-02-02 DIAGNOSIS — G47.20 CIRCADIAN RHYTHM DISORDER: Primary | ICD-10-CM

## 2023-02-02 PROCEDURE — 3078F DIAST BP <80 MM HG: CPT | Mod: CPTII,S$GLB,, | Performed by: INTERNAL MEDICINE

## 2023-02-02 PROCEDURE — 3078F PR MOST RECENT DIASTOLIC BLOOD PRESSURE < 80 MM HG: ICD-10-PCS | Mod: CPTII,S$GLB,, | Performed by: INTERNAL MEDICINE

## 2023-02-02 PROCEDURE — 99204 OFFICE O/P NEW MOD 45 MIN: CPT | Mod: S$GLB,,, | Performed by: INTERNAL MEDICINE

## 2023-02-02 PROCEDURE — 1159F MED LIST DOCD IN RCRD: CPT | Mod: CPTII,S$GLB,, | Performed by: INTERNAL MEDICINE

## 2023-02-02 PROCEDURE — 99999 PR PBB SHADOW E&M-EST. PATIENT-LVL IV: CPT | Mod: PBBFAC,,, | Performed by: INTERNAL MEDICINE

## 2023-02-02 PROCEDURE — 3008F PR BODY MASS INDEX (BMI) DOCUMENTED: ICD-10-PCS | Mod: CPTII,S$GLB,, | Performed by: INTERNAL MEDICINE

## 2023-02-02 PROCEDURE — 99204 PR OFFICE/OUTPT VISIT, NEW, LEVL IV, 45-59 MIN: ICD-10-PCS | Mod: S$GLB,,, | Performed by: INTERNAL MEDICINE

## 2023-02-02 PROCEDURE — 3008F BODY MASS INDEX DOCD: CPT | Mod: CPTII,S$GLB,, | Performed by: INTERNAL MEDICINE

## 2023-02-02 PROCEDURE — 1159F PR MEDICATION LIST DOCUMENTED IN MEDICAL RECORD: ICD-10-PCS | Mod: CPTII,S$GLB,, | Performed by: INTERNAL MEDICINE

## 2023-02-02 PROCEDURE — 3077F PR MOST RECENT SYSTOLIC BLOOD PRESSURE >= 140 MM HG: ICD-10-PCS | Mod: CPTII,S$GLB,, | Performed by: INTERNAL MEDICINE

## 2023-02-02 PROCEDURE — 3077F SYST BP >= 140 MM HG: CPT | Mod: CPTII,S$GLB,, | Performed by: INTERNAL MEDICINE

## 2023-02-02 PROCEDURE — 99999 PR PBB SHADOW E&M-EST. PATIENT-LVL IV: ICD-10-PCS | Mod: PBBFAC,,, | Performed by: INTERNAL MEDICINE

## 2023-02-02 RX ORDER — CELECOXIB 100 MG/1
100 CAPSULE ORAL 2 TIMES DAILY PRN
Qty: 60 CAPSULE | Refills: 1 | Status: SHIPPED | OUTPATIENT
Start: 2023-02-02

## 2023-02-02 RX ORDER — ALBUTEROL SULFATE 90 UG/1
1-2 AEROSOL, METERED RESPIRATORY (INHALATION) EVERY 4 HOURS PRN
Qty: 18 G | Refills: 5 | Status: SHIPPED | OUTPATIENT
Start: 2023-02-02

## 2023-02-02 NOTE — PROGRESS NOTES
2/2/2023    Jose Ibrahim  New Patient Consult    Chief Complaint   Patient presents with    Fatigue     Pt states 6-7 hours of sleep, sometimes snore depending on how pt sleeps.     Chest Pain     Chest tightness    Shortness of Breath     Due to feeling fatigue.        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  Having chest tightness offshore when arousing for midnight shift.   No cough nor wheezes.  Had bad allergies as kid involved sinuses.  No asthma as kid .  Slight tightness.        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  Pt will worsen 1st day out will have tightness and left chest pain assoc sob-- feels like needs to take small breaths with freq yawns.  No progression during 2 wks working and remits 1st day upon return.        If pt does shift starting midnight pt will notice above ----but if shift starts days no issues.    Pt has been doing pattern last 5 yrs -- but last 10 months would notice problem.    Pt will do a  2 wk shift starting at midnight with issues, home 2 wks no issues, then 2 wks starting at noon with no issues, then 2 wks home no issues.    Uses advil -- uses 3/wk.      Pt has recal of using inhaler with kim soccer playing.     PFSH:  Past Medical History:   Diagnosis Date    Environmental allergies     RAD (reactive airway disease)     RAD (reactive airway disease)          Past Surgical History:   Procedure Laterality Date    CIRCUMCISION, PRIMARY       Social History     Tobacco Use    Smoking status: Never   Substance Use Topics    Alcohol use: No    Drug use: No     Family History   Problem Relation Age of Onset    Allergic rhinitis Father     Allergies Sister     Angioedema Neg Hx     Asthma Neg Hx     Atopy Neg Hx     Eczema Neg Hx     Immunodeficiency Neg Hx     Urticaria Neg Hx      Review of patient's allergies indicates:   Allergen Reactions    No known drug allergies           Review of Systems:  a review of eleven systems  "covering constitutional, Eye, HEENT, Psych, Respiratory, Cardiac, GI, , Musculoskeletal, Endocrine, Dermatologic was negative except for pertinent findings as listed ABOVE and below:  pertinent positives as above, rest good          Exam:Comprehensive exam done. BP (!) 141/73 (BP Location: Left arm, Patient Position: Sitting, BP Method: Medium (Automatic))   Pulse 67   Ht 6' 2" (1.88 m)   Wt 93.6 kg (206 lb 7.4 oz)   SpO2 99% Comment: room air at rest  BMI 26.51 kg/m²   Exam included Vitals as listed, and patient's appearance and affect and alertness and mood, oral exam for yeast and hygiene and pharynx lesions and Mallapatti (M) score, neck with inspection for jvd and masses and thyroid abnormalities and lymph nodes (supraclavicular and infraclavicular nodes and axillary also examined and noted if abn), chest exam included symmetry and effort and fremitus and percussion and auscultation, cardiac exam included rhythm and gallops and murmur and rubs and jvd and edema, abdominal exam for mass and hepatosplenomegaly and tenderness and hernias and bowel sounds, Musculoskeletal exam with muscle tone and posture and mobility/gait and  strength, and skin for rashes and cyanosis and pallor and turgor, extremity for clubbing.  Findings were normal except for pertinent findings listed below:  M1 , healthy, chest is symmetric, no distress, normal percussion, normal fremitus and good normal breath sounds rest good.           Radiographs (ct chest and cxr) reviewed: view by direct vision  prior left shoulder xray with clear left lung 2020 viewed.     Labs reviewed           PFT will be done and results to be reviewed       Plan:  Clinical impression is apparently straight forward and impression with management as below.     Jose was seen today for fatigue, chest pain and shortness of breath.    Diagnoses and all orders for this visit:    Circadian rhythm disorder    Chest pain, unspecified type  -     X-Ray Chest PA " And Lateral; Future  -     Complete PFT with bronchodilator; Future  -     celecoxib (CELEBREX) 100 MG capsule; Take 1 capsule (100 mg total) by mouth 2 (two) times daily as needed for Pain.    SOB (shortness of breath)  -     D-DIMER, QUANTITATIVE; Future  -     X-Ray Chest PA And Lateral; Future  -     Complete PFT with bronchodilator; Future  -     fluticasone-umeclidin-vilanter (TRELEGY ELLIPTA) 100-62.5-25 mcg DsDv; Inhale 1 puff into the lungs once daily.  -     albuterol (PROVENTIL/VENTOLIN HFA) 90 mcg/actuation inhaler; Inhale 1-2 puffs into the lungs every 4 (four) hours as needed for Wheezing or Shortness of Breath. 2 puffs every 4 hours as needed for cough, wheeze, or shortness of breath        Follow up if symptoms worsen or fail to improve.    Discussed with patient above for education the following:      Patient Instructions   With allergies may have asthma component??    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  May use trelegy or albuterol for asthma concerns.  If trelegy helps -- would recommend trial just albuterol as needed.    Could check for cxr abnormalities and blood clot screen but yield very low with history.  Could check breathing test to assure no airway disease measurable.    Circadian rhythm issues or shift work problems do not fit symptoms.      Could try to adjust sleep time onset prior to offshore.      May have rib joint injury-- may clear with time, check joints with palpation.  Could dose celebrex twice daily as needed-- use continuous next outing of concern for symptoms..      Eval took 53 min

## 2023-02-02 NOTE — TELEPHONE ENCOUNTER
Cover my meds #[id number]        Approvedtoday  CaseId:[id number];Status:Approved;Review Type:Prior Auth;Coverage Start Date:01/03/2023;Coverage End Date:02/02/2024;

## 2023-02-02 NOTE — PATIENT INSTRUCTIONS
With allergies may have asthma component??    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  May use trelegy or albuterol for asthma concerns.  If trelegy helps -- would recommend trial just albuterol as needed.    Could check for cxr abnormalities and blood clot screen but yield very low with history.  Could check breathing test to assure no airway disease measurable.    Circadian rhythm issues or shift work problems do not fit symptoms.      Could try to adjust sleep time onset prior to offshore.      May have rib joint injury-- may clear with time, check joints with palpation.  Could dose celebrex twice daily as needed-- use continuous next outing of concern for symptoms..

## 2023-02-03 ENCOUNTER — OFFICE VISIT (OUTPATIENT)
Dept: CARDIOLOGY | Facility: CLINIC | Age: 28
End: 2023-02-03
Payer: COMMERCIAL

## 2023-02-03 ENCOUNTER — HOSPITAL ENCOUNTER (OUTPATIENT)
Dept: CARDIOLOGY | Facility: HOSPITAL | Age: 28
Discharge: HOME | End: 2023-02-03
Attending: INTERNAL MEDICINE
Payer: COMMERCIAL

## 2023-02-03 VITALS
DIASTOLIC BLOOD PRESSURE: 81 MMHG | SYSTOLIC BLOOD PRESSURE: 128 MMHG | RESPIRATION RATE: 18 BRPM | BODY MASS INDEX: 26.33 KG/M2 | HEIGHT: 74 IN | HEART RATE: 68 BPM | OXYGEN SATURATION: 98 % | WEIGHT: 205.19 LBS

## 2023-02-03 DIAGNOSIS — R07.9 CHEST PAIN, UNSPECIFIED TYPE: Primary | ICD-10-CM

## 2023-02-03 DIAGNOSIS — R00.2 PALPITATIONS: Primary | ICD-10-CM

## 2023-02-03 DIAGNOSIS — R07.9 CHEST PAIN, UNSPECIFIED TYPE: ICD-10-CM

## 2023-02-03 PROCEDURE — 93010 EKG 12-LEAD: ICD-10-PCS | Mod: ,,, | Performed by: INTERNAL MEDICINE

## 2023-02-03 PROCEDURE — 99214 PR OFFICE/OUTPT VISIT, EST, LEVL IV, 30-39 MIN: ICD-10-PCS | Mod: S$GLB,,, | Performed by: INTERNAL MEDICINE

## 2023-02-03 PROCEDURE — 3008F BODY MASS INDEX DOCD: CPT | Mod: CPTII,S$GLB,, | Performed by: INTERNAL MEDICINE

## 2023-02-03 PROCEDURE — 93010 ELECTROCARDIOGRAM REPORT: CPT | Mod: ,,, | Performed by: INTERNAL MEDICINE

## 2023-02-03 PROCEDURE — 3008F PR BODY MASS INDEX (BMI) DOCUMENTED: ICD-10-PCS | Mod: CPTII,S$GLB,, | Performed by: INTERNAL MEDICINE

## 2023-02-03 PROCEDURE — 3074F PR MOST RECENT SYSTOLIC BLOOD PRESSURE < 130 MM HG: ICD-10-PCS | Mod: CPTII,S$GLB,, | Performed by: INTERNAL MEDICINE

## 2023-02-03 PROCEDURE — 1159F PR MEDICATION LIST DOCUMENTED IN MEDICAL RECORD: ICD-10-PCS | Mod: CPTII,S$GLB,, | Performed by: INTERNAL MEDICINE

## 2023-02-03 PROCEDURE — 99999 PR PBB SHADOW E&M-EST. PATIENT-LVL III: CPT | Mod: PBBFAC,,, | Performed by: INTERNAL MEDICINE

## 2023-02-03 PROCEDURE — 99999 PR PBB SHADOW E&M-EST. PATIENT-LVL III: ICD-10-PCS | Mod: PBBFAC,,, | Performed by: INTERNAL MEDICINE

## 2023-02-03 PROCEDURE — 3079F PR MOST RECENT DIASTOLIC BLOOD PRESSURE 80-89 MM HG: ICD-10-PCS | Mod: CPTII,S$GLB,, | Performed by: INTERNAL MEDICINE

## 2023-02-03 PROCEDURE — 3079F DIAST BP 80-89 MM HG: CPT | Mod: CPTII,S$GLB,, | Performed by: INTERNAL MEDICINE

## 2023-02-03 PROCEDURE — 3074F SYST BP LT 130 MM HG: CPT | Mod: CPTII,S$GLB,, | Performed by: INTERNAL MEDICINE

## 2023-02-03 PROCEDURE — 93005 ELECTROCARDIOGRAM TRACING: CPT | Mod: PO

## 2023-02-03 PROCEDURE — 99214 OFFICE O/P EST MOD 30 MIN: CPT | Mod: S$GLB,,, | Performed by: INTERNAL MEDICINE

## 2023-02-03 PROCEDURE — 1160F RVW MEDS BY RX/DR IN RCRD: CPT | Mod: CPTII,S$GLB,, | Performed by: INTERNAL MEDICINE

## 2023-02-03 PROCEDURE — 1159F MED LIST DOCD IN RCRD: CPT | Mod: CPTII,S$GLB,, | Performed by: INTERNAL MEDICINE

## 2023-02-03 PROCEDURE — 1160F PR REVIEW ALL MEDS BY PRESCRIBER/CLIN PHARMACIST DOCUMENTED: ICD-10-PCS | Mod: CPTII,S$GLB,, | Performed by: INTERNAL MEDICINE

## 2023-02-03 NOTE — PROGRESS NOTES
"Subjective:    Patient ID:  Jose Ibrahim is a 27 y.o. male who presents for follow-up of Chest Pain and Shortness of Breath      HPI27 yo WM with hx of palpitations. 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 Has a "[employer]dia device and reviewed his recordings and all are OK. He stays in good shape and no issues with exercise.     Review of Systems   Cardiovascular:  Positive for irregular heartbeat, palpitations and syncope. Negative for chest pain, claudication, cyanosis, dyspnea on exertion, leg swelling, near-syncope, orthopnea and paroxysmal nocturnal dyspnea.      Objective:    Physical Exam  Constitutional:       General: He is not in acute distress.     Appearance: He is well-developed. He is not diaphoretic.      Comments: /81 (BP Location: Left arm)   Pulse 68   Resp 18   Ht 6' 2" (1.88 m)   Wt 93.1 kg (205 lb 3.2 oz)   SpO2 98%   BMI 26.35 kg/m²      HENT:      Head: Normocephalic and atraumatic.   Eyes:      General: Lids are normal. No scleral icterus.        Right eye: No discharge.      Conjunctiva/sclera: Conjunctivae normal.      Pupils: Pupils are equal, round, and reactive to light.   Neck:      Thyroid: No thyromegaly.      Vascular: No JVD.      Trachea: No tracheal deviation.   Cardiovascular:      Rate and Rhythm: Normal rate and regular rhythm.      Pulses: Intact distal pulses.           Carotid pulses are 2+ on the right side and 2+ on the left side.       Radial pulses are 2+ on the right side and 2+ on the left side.        Femoral pulses are 2+ on the right side and 2+ on the left side.       Popliteal pulses are 2+ on the right side and 2+ on the left side.        Dorsalis pedis pulses are 2+ on the right side and 2+ on the left side.        Posterior tibial pulses are 2+ on the right side and 2+ on the left side.      Heart sounds: Normal heart sounds, S1 normal and S2 normal. No murmur heard.    No " friction rub. No gallop.   Pulmonary:      Effort: Pulmonary effort is normal. No respiratory distress.      Breath sounds: Normal breath sounds. No wheezing or rales.   Chest:      Chest wall: No tenderness.   Abdominal:      General: Bowel sounds are normal. There is no distension.      Palpations: Abdomen is soft. There is no hepatomegaly or mass.      Tenderness: There is no abdominal tenderness. There is no guarding or rebound.   Musculoskeletal:         General: No tenderness. Normal range of motion.      Cervical back: Normal range of motion and neck supple.   Lymphadenopathy:      Cervical: No cervical adenopathy.   Skin:     General: Skin is warm and dry.      Coloration: Skin is not pale.      Findings: No erythema or rash.   Neurological:      Mental Status: He is alert and oriented to person, place, and time.      Cranial Nerves: No cranial nerve deficit.      Coordination: Coordination normal.      Deep Tendon Reflexes: Reflexes are normal and symmetric.   Psychiatric:         Speech: Speech normal.         Behavior: Behavior normal.         Thought Content: Thought content normal.         Judgment: Judgment normal.       Resting EKG sinus arrhythmia.  Assessment:       1. 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

## 2023-02-09 ENCOUNTER — HOSPITAL ENCOUNTER (OUTPATIENT)
Dept: CARDIOLOGY | Facility: HOSPITAL | Age: 28
Discharge: HOME | End: 2023-02-09
Attending: INTERNAL MEDICINE
Payer: COMMERCIAL

## 2023-02-09 DIAGNOSIS — R00.2 PALPITATIONS: ICD-10-CM

## 2023-02-09 PROCEDURE — 93227 XTRNL ECG REC<48 HR R&I: CPT | Mod: ,,, | Performed by: INTERNAL MEDICINE

## 2023-02-09 PROCEDURE — 93227 HOLTER MONITOR - 48 HOUR (CUPID ONLY): ICD-10-PCS | Mod: ,,, | Performed by: INTERNAL MEDICINE

## 2023-02-09 PROCEDURE — 93225 XTRNL ECG REC<48 HRS REC: CPT | Mod: PO

## 2023-02-14 ENCOUNTER — TELEPHONE (OUTPATIENT)
Dept: CARDIOLOGY | Facility: CLINIC | Age: 28
End: 2023-02-14
Payer: COMMERCIAL

## 2023-02-14 LAB
OHS CV EVENT MONITOR DAY: 0
OHS CV HOLTER LENGTH DECIMAL HOURS: 48
OHS CV HOLTER LENGTH HOURS: 48
OHS CV HOLTER LENGTH MINUTES: 0
OHS CV HOLTER SINUS AVERAGE HR: 79
OHS CV HOLTER SINUS MAX HR: 176
OHS CV HOLTER SINUS MIN HR: 49

## 2023-03-07 ENCOUNTER — HOSPITAL ENCOUNTER (OUTPATIENT)
Dept: CARDIOLOGY | Facility: HOSPITAL | Age: 28
Discharge: HOME | End: 2023-03-07
Attending: INTERNAL MEDICINE
Payer: COMMERCIAL

## 2023-03-07 ENCOUNTER — TELEPHONE (OUTPATIENT)
Dept: CARDIOLOGY | Facility: CLINIC | Age: 28
End: 2023-03-07

## 2023-03-07 VITALS
BODY MASS INDEX: 26.31 KG/M2 | DIASTOLIC BLOOD PRESSURE: 80 MMHG | HEIGHT: 74 IN | HEART RATE: 60 BPM | WEIGHT: 205 LBS | SYSTOLIC BLOOD PRESSURE: 128 MMHG

## 2023-03-07 LAB
AORTIC ROOT ANNULUS: 2.83 CM
ASCENDING AORTA: 2.67 CM
AV INDEX (PROSTH): 0.72
AV MEAN GRADIENT: 4 MMHG
AV PEAK GRADIENT: 7 MMHG
AV VALVE AREA: 2.91 CM2
AV VELOCITY RATIO: 0.77
BSA FOR ECHO PROCEDURE: 2.2 M2
CV ECHO LV RWT: 0.49 CM
DOP CALC AO PEAK VEL: 1.33 M/S
DOP CALC AO VTI: 28.5 CM
DOP CALC LVOT AREA: 4 CM2
DOP CALC LVOT DIAMETER: 2.27 CM
DOP CALC LVOT PEAK VEL: 1.03 M/S
DOP CALC LVOT STROKE VOLUME: 82.92 CM3
DOP CALC RVOT PEAK VEL: 0.84 M/S
DOP CALC RVOT VTI: 17.3 CM
DOP CALCLVOT PEAK VEL VTI: 20.5 CM
E WAVE DECELERATION TIME: 189.34 MSEC
E/A RATIO: 1.54
ECHO LV POSTERIOR WALL: 1.06 CM (ref 0.6–1.1)
EJECTION FRACTION: 60 %
FRACTIONAL SHORTENING: 32 % (ref 28–44)
INTERVENTRICULAR SEPTUM: 1.12 CM (ref 0.6–1.1)
IVRT: 91.34 MSEC
LA MAJOR: 4.64 CM
LA MINOR: 4.61 CM
LA WIDTH: 3.4 CM
LEFT ATRIUM SIZE: 3.73 CM
LEFT ATRIUM VOLUME INDEX MOD: 18.9 ML/M2
LEFT ATRIUM VOLUME INDEX: 22.7 ML/M2
LEFT ATRIUM VOLUME MOD: 41.64 CM3
LEFT ATRIUM VOLUME: 49.86 CM3
LEFT INTERNAL DIMENSION IN SYSTOLE: 2.95 CM (ref 2.1–4)
LEFT VENTRICLE DIASTOLIC VOLUME INDEX: 38.23 ML/M2
LEFT VENTRICLE DIASTOLIC VOLUME: 84.11 ML
LEFT VENTRICLE MASS INDEX: 74 G/M2
LEFT VENTRICLE SYSTOLIC VOLUME INDEX: 15.3 ML/M2
LEFT VENTRICLE SYSTOLIC VOLUME: 33.64 ML
LEFT VENTRICULAR INTERNAL DIMENSION IN DIASTOLE: 4.32 CM (ref 3.5–6)
LEFT VENTRICULAR MASS: 162.01 G
LVOT MG: 2.45 MMHG
LVOT MV: 0.73 CM/S
MV PEAK A VEL: 0.56 M/S
MV PEAK E VEL: 0.86 M/S
MV STENOSIS PRESSURE HALF TIME: 54.91 MS
MV VALVE AREA P 1/2 METHOD: 4.01 CM2
PULM VEIN S/D RATIO: 0.63
PV MEAN GRADIENT: 1.45 MMHG
PV PEAK D VEL: 0.59 M/S
PV PEAK S VEL: 0.37 M/S
PV PEAK VELOCITY: 1.39 CM/S
RA MAJOR: 4.55 CM
RA PRESSURE: 3 MMHG
RIGHT VENTRICULAR END-DIASTOLIC DIMENSION: 3 CM
STJ: 2.38 CM

## 2023-03-07 PROCEDURE — 93306 TTE W/DOPPLER COMPLETE: CPT | Mod: PO

## 2023-03-07 PROCEDURE — 93306 ECHO (CUPID ONLY): ICD-10-PCS | Mod: 26,,, | Performed by: INTERNAL MEDICINE

## 2023-03-07 PROCEDURE — 93306 TTE W/DOPPLER COMPLETE: CPT | Mod: 26,,, | Performed by: INTERNAL MEDICINE

## 2023-04-04 ENCOUNTER — OCCUPATIONAL HEALTH (OUTPATIENT)
Dept: URGENT CARE | Facility: CLINIC | Age: 28
End: 2023-04-04

## 2023-04-04 VITALS
HEIGHT: 75 IN | WEIGHT: 230 LBS | SYSTOLIC BLOOD PRESSURE: 135 MMHG | DIASTOLIC BLOOD PRESSURE: 69 MMHG | HEART RATE: 62 BPM | RESPIRATION RATE: 16 BRPM | TEMPERATURE: 98 F | BODY MASS INDEX: 28.6 KG/M2 | OXYGEN SATURATION: 97 %

## 2023-04-04 DIAGNOSIS — Z00.00 PHYSICAL EXAM: Primary | ICD-10-CM

## 2023-04-04 PROCEDURE — 80305 DRUG TEST PRSMV DIR OPT OBS: CPT | Mod: S$GLB,,, | Performed by: FAMILY MEDICINE

## 2023-04-04 PROCEDURE — 80305 OOH COLLECTION ONLY DRUG SCREEN: ICD-10-PCS | Mod: S$GLB,,, | Performed by: FAMILY MEDICINE

## 2023-04-04 PROCEDURE — 99499 COAST GUARD PHYSICAL: ICD-10-PCS | Mod: S$GLB,,, | Performed by: FAMILY MEDICINE

## 2023-04-04 PROCEDURE — 99499 UNLISTED E&M SERVICE: CPT | Mod: S$GLB,,, | Performed by: FAMILY MEDICINE

## 2024-06-07 ENCOUNTER — OCCUPATIONAL HEALTH (OUTPATIENT)
Dept: URGENT CARE | Facility: CLINIC | Age: 29
End: 2024-06-07

## 2024-06-07 DIAGNOSIS — Z00.00 ENCOUNTER FOR PHYSICAL EXAMINATION: Primary | ICD-10-CM

## 2024-06-07 PROCEDURE — 99499 UNLISTED E&M SERVICE: CPT | Mod: S$GLB,,, | Performed by: STUDENT IN AN ORGANIZED HEALTH CARE EDUCATION/TRAINING PROGRAM

## 2025-05-07 ENCOUNTER — HOSPITAL ENCOUNTER (OUTPATIENT)
Dept: RADIOLOGY | Facility: HOSPITAL | Age: 30
Discharge: HOME | End: 2025-05-07
Attending: PHYSICIAN ASSISTANT
Payer: COMMERCIAL

## 2025-05-07 ENCOUNTER — OFFICE VISIT (OUTPATIENT)
Dept: ORTHOPEDICS | Facility: CLINIC | Age: 30
End: 2025-05-07
Payer: COMMERCIAL

## 2025-05-07 VITALS — WEIGHT: 205 LBS | BODY MASS INDEX: 25.49 KG/M2 | HEIGHT: 75 IN

## 2025-05-07 DIAGNOSIS — M75.51 SUBACROMIAL BURSITIS OF RIGHT SHOULDER JOINT: Primary | ICD-10-CM

## 2025-05-07 PROCEDURE — 73030 X-RAY EXAM OF SHOULDER: CPT | Mod: TC,PN,RT

## 2025-05-07 PROCEDURE — 99999 PR PBB SHADOW E&M-EST. PATIENT-LVL III: CPT | Mod: PBBFAC,,, | Performed by: PHYSICIAN ASSISTANT

## 2025-05-07 RX ORDER — TRIAMCINOLONE ACETONIDE 40 MG/ML
40 INJECTION, SUSPENSION INTRA-ARTICULAR; INTRAMUSCULAR
Status: DISCONTINUED | OUTPATIENT
Start: 2025-05-07 | End: 2025-05-07 | Stop reason: HOSPADM

## 2025-05-07 RX ORDER — MULTIVITAMIN WITH IRON
2 TABLET ORAL
COMMUNITY

## 2025-05-07 RX ADMIN — TRIAMCINOLONE ACETONIDE 40 MG: 40 INJECTION, SUSPENSION INTRA-ARTICULAR; INTRAMUSCULAR at 11:05

## 2025-05-07 NOTE — PROGRESS NOTES
[facility]  [address]  [place] [address]  Phone: [phone]   [phone]    Patient's PCP: Oscar Jose MD  Referring Provider: No ref. provider found    Subjective:      Chief Complaint:   Chief Complaint   Patient presents with    Right Shoulder - Pain     Right shoulder x 8 months, no accident, injury or trauma, limited and painful ROM, able to reach back, pain with certain positions,        Past Medical History:   Diagnosis Date    Environmental allergies     RAD (reactive airway disease)     RAD (reactive airway disease)        Past Surgical History:   Procedure Laterality Date    CIRCUMCISION, PRIMARY         Current Outpatient Medications   Medication Sig    cetirizine (ZYRTEC) 10 MG tablet Take 1 tablet (10 mg total) by mouth once daily.    omega-3 fatty acids/fish oil (FISH OIL-OMEGA-3 FATTY ACIDS) 300-1,000 mg capsule Take 2 g by mouth.    albuterol (PROVENTIL/VENTOLIN HFA) 90 mcg/actuation inhaler Inhale 1-2 puffs into the lungs every 4 (four) hours as needed for Wheezing or Shortness of Breath. 2 puffs every 4 hours as needed for cough, wheeze, or shortness of breath (Patient not taking: Reported on 5/7/2025)    celecoxib (CELEBREX) 100 MG capsule Take 1 capsule (100 mg total) by mouth 2 (two) times daily as needed for Pain. (Patient not taking: Reported on 5/7/2025)    fluticasone propionate (FLONASE) 50 mcg/actuation nasal spray 2 sprays (100 mcg total) by Each Nostril route once daily. (Patient not taking: Reported on 5/7/2025)    fluticasone-umeclidin-vilanter (TRELEGY ELLIPTA) 100-62.5-25 mcg DsDv Inhale 1 puff into the lungs once daily. (Patient not taking: Reported on 5/7/2025)    loratadine (CLARITIN) 10 mg tablet Take 10 mg by mouth once daily. (Patient not taking: Reported on 5/7/2025)     Current Facility-Administered Medications   Medication    Allergy Mix    Allergy Mix       Review of patient's allergies indicates:   Allergen Reactions    No known  drug allergies        Family History   Problem Relation Name Age of Onset    Allergic rhinitis Father Jose     Allergies Sister raul     Angioedema Neg Hx      Asthma Neg Hx      Atopy Neg Hx      Eczema Neg Hx      Immunodeficiency Neg Hx      Urticaria Neg Hx         Social History[1]    Prior to meeting with the patient I reviewed the medical chart in [facility]. This included reviewing the previous progress notes from our office, review of the patient's last appointment with their primary care provider, review of any visits to the emergency room, and review of any pain management appointments or procedures.    History of present illness: 30 y.o. male,  presents to clinic today for evaluation of complaints of right shoulder pain.  Insidious onset nontraumatic right shoulder pain about 8 months ago.  He is active he works out in the gym really bothers him with doing bench press or overhead work.  Not severe however just not getting better either.  History of a right clavicle fracture playing basketball many years ago.       Review of Systems:    Constitutional: Negative for chills, fever and weight loss.   HENT: Negative for congestion.    Eyes: Negative for discharge and redness.   Respiratory: Negative for cough and shortness of breath.    Cardiovascular: Negative for chest pain.   Gastrointestinal: Negative for nausea and vomiting.   Musculoskeletal: See HPI.   Skin: Negative for rash.   Neurological: Negative for headaches.   Endo/Heme/Allergies: Does not bruise/bleed easily.   Psychiatric/Behavioral: The patient is not nervous/anxious.    All other systems reviewed and are negative.       Objective:      Physical Examination:    Vital Signs:  There were no vitals filed for this visit.    Body mass index is 25.97 kg/m².    This a well-developed, well nourished patient in no acute distress.  They are alert and oriented and cooperative to examination.     Examination of the right shoulder, skin is dry and intact, no  erythema or ecchymosis, no signs symptoms of infection.  No sulcus sign, no apprehension.  Full range of motion in all planes.  He did have some catch/crepitus with range of motion.  Discomfort with Neer's test negative Elizabeth negative crossover and rotator cuff grossly intact resisted testing but again he did complain of some discomfort.      Pertinent New Results:        XRAY Report / Interpretation:   AP and lateral views of the right shoulder taken today in the office demonstrate a healed right midshaft clavicle fracture.  No other acute osseous abnormalities.    Procedure/s:  Large Joint Aspiration/Injection: R subacromial bursa    Date/Time: 5/7/2025 11:00 AM    Performed by: Rodri Edge PA  Authorized by: Rodri Edge PA    Consent Done?:  Yes (Verbal)  Indications:  Pain  Site marked: the procedure site was marked    Timeout: prior to procedure the correct patient, procedure, and site was verified    Prep: patient was prepped and draped in usual sterile fashion      Local anesthesia used?: Yes    Local anesthetic:  Lidocaine 1% without epinephrine    Details:  Needle Size:  25 G  Ultrasonic Guidance for needle placement?: No    Location:  Shoulder  Site:  R subacromial bursa  Medications:  40 mg triamcinolone acetonide 40 mg/mL  Patient tolerance:  Patient tolerated the procedure well with no immediate complications           Assessment:       1. Subacromial bursitis of right shoulder joint      Plan:     Subacromial bursitis of right shoulder joint  -     X-ray Shoulder 2 or More Views Right  -     Large Joint Aspiration/Injection: R subacromial bursa  -     Ambulatory Referral/Consult to Physical Therapy        Follow up in about 4 weeks (around 6/4/2025) for R shoulder inj 5/7/25, PT f/u.    Right shoulder impingement, we injected the subacromial space lidocaine and triamcinolone sterile technique he tolerated well.  We are going to place him in some physical therapy he does work off  sure we are going to have him educated on a home exercise program.  He will follow up after his next off sure schedule in about 6 weeks.    Risks and benefits were discussed with patient prior to receiving injection.  Depending on injection type, risks include the possibility of infection, pain, disruptions in blood pressure and blood sugar, and cosmetic deformity at site of injection.    The patient and I had a thorough discussion today.  We discussed the working diagnosis as well as several other potential alternative diagnoses.  We discussed treatment options, both conservative and surgical.  Conservative treatment options would include things such as activity modifications, workplace modifications, a period of rest, oral versus topical over the counter and oral versus topical prescription anti-inflammatory medications, physical therapy / occupational therapy, splinting / bracing, immobilization, corticosteroid injections, and others.        PHILLIP Mcgee, PA-C        EMR Statement:  Please note that portions of this patient encounter record were imported from the patients electronic medical record and that others were dictated using voice recognition software. For these reasons grammatical errors, nonsensical language, and apparently contradictory statements may be present.  These should be disregarded or interpreted with respect to the context of the document.       [1]   Social History  Socioeconomic History    Marital status: Single   Tobacco Use    Smoking status: Never   Substance and Sexual Activity    Alcohol use: No    Drug use: No   Social History Narrative    SOC. HX:  Lives w/ Mom, Dad, Sister. NO Smokers. + Pets -- 1 dog, 1 cat (Inside). 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        Allergic to cats dogs and dust.  Has received immune therapy to cats and dust.  Will begin receiving immune therapy to dog dander. Has cats and dogs at home but Family does not want to eliminate that  exposure.

## 2025-05-07 NOTE — PROCEDURES
Large Joint Aspiration/Injection: R subacromial bursa    Date/Time: 5/7/2025 11:00 AM    Performed by: Rodri Edge PA  Authorized by: Rodri Edge PA    Consent Done?:  Yes (Verbal)  Indications:  Pain  Site marked: the procedure site was marked    Timeout: prior to procedure the correct patient, procedure, and site was verified    Prep: patient was prepped and draped in usual sterile fashion      Local anesthesia used?: Yes    Local anesthetic:  Lidocaine 1% without epinephrine    Details:  Needle Size:  25 G  Ultrasonic Guidance for needle placement?: No    Location:  Shoulder  Site:  R subacromial bursa  Medications:  40 mg triamcinolone acetonide 40 mg/mL  Patient tolerance:  Patient tolerated the procedure well with no immediate complications

## 2025-05-13 ENCOUNTER — CLINICAL SUPPORT (OUTPATIENT)
Dept: REHABILITATION | Facility: HOSPITAL | Age: 30
End: 2025-05-13
Payer: COMMERCIAL

## 2025-05-13 DIAGNOSIS — M25.511 CHRONIC RIGHT SHOULDER PAIN: ICD-10-CM

## 2025-05-13 DIAGNOSIS — G89.29 CHRONIC RIGHT SHOULDER PAIN: ICD-10-CM

## 2025-05-13 DIAGNOSIS — R68.89 DECREASED FUNCTIONAL ACTIVITY TOLERANCE: Primary | ICD-10-CM

## 2025-05-13 DIAGNOSIS — R53.1 WEAKNESS: ICD-10-CM

## 2025-05-13 DIAGNOSIS — Z78.9 NEED FOR HOME EXERCISE PROGRAM: ICD-10-CM

## 2025-05-13 PROCEDURE — 97530 THERAPEUTIC ACTIVITIES: CPT | Mod: PN

## 2025-05-13 PROCEDURE — 97161 PT EVAL LOW COMPLEX 20 MIN: CPT | Mod: PN

## 2025-05-13 NOTE — PATIENT INSTRUCTIONS
Home Exercise Program  Created by Nilay Zhao PT  May 13th, 2025  View videos at [email or web address]        Doorway Stretch    Place each hand opposite each other on the doorway. (You can change where you feel the stretch by moving arms higher or lower.)  Step through with one foot and bend front knee until a COMFORTABLE stretch is felt and hold.  Step through with the opposite foot on the next rep. Repeat 3 Times   Hold 30 Seconds   Complete 1 Set   Perform 1 Times a Day          Sleeper Stretch    Sleeper Stretch    Laying on the affected shoulder. Bring the shoulder that is against the surface up to shoulder height. Using the arm that isn't on the mat take that hand and press the backside of the hand of the arm that is against the surface and press towards the navel until a stretch is felt in the backside of the shoulder    (Adjustments: if stress is felt in the front of the shoulder make sure you are laying completely on side and not leaning back; also make sure the shoulder that is against the surface the elbow is at shoulder height) Repeat 3 Times   Hold 30 Seconds   Complete 1 Set   Perform 1 Times a Day          Wall Clocks    With a resistance band in your hands. Put your hands on the wall. Apply tension and then in a clockwise direction move your right hand to 12,3 and 6 before returning to starting position. Then move your left hand to 12, 9, and 6. That is one repetition.    Make sure you can control the band. You don't want so much tension that it pulls you inward. Repeat 10 Times   Complete 2 Sets   Perform 4 Times a Week          Thoracic Extension    Start by positioning the foam roller under your shoulder blades with your arms behind your head and hips down on the ground. Knees should be bent and feet flat. Gently lean back over the roller to extend your spine. Try this at multiple sections of your back from the top to the bottom of your shoulder blades.    You can also try lifting your hips up  and rolling on the roller from the top to the bottom of the shoulderblades.    Do not push to hard over lower ribs or go into curvature of low back with the roller Repeat 10 Times   Hold 3 Seconds   Complete 2 Sets   Perform 4 Times a Week          TRUNK EXTENSION - TOWEL - AROM - MOBILIZATION    While sitting in a chair, extend your thoracic spine backwards over a rolled up towel against the back rest.    Video # [id number] Repeat 10 Times   Hold 3 Seconds   Complete 2 Sets   Perform 3 Times a Week          Wall Slides    Face towards a wall with hands on the wall. Keep arms parallel, pushing hands into the wall. Extend both arms, straight up, hold for specified amount of time. Lower arms slowly back into starting position. Repeat. Repeat 10 Times   Hold 3 Seconds   Complete 2 Sets   Perform 4 Times a Week          SIDE LYING EXTERNAL ROTATION WITH TOWEL - ER    Lie on your side with your elbow bent to 90 degrees. Place a rolled up towel between your arm and the side your body as shown.    Squeeze your shoulder blade back and down toward your buttocks and hold that position.    Next, roll your arm upwards from your stomach area towards the ceiling while maintaining your arm against the towel and with your shoulder blade held down and back the entire time. Lower your arm and repeat.    Video # [id number] Repeat 10 Times   Hold 1 Second   Complete 3 Sets   Perform 4 Times a Week          Scapular Retraction    Wrap an elastic band around a door knob or banister. Grab the ends of the band with both hands with your arms extended. With good posture, pull the band backwards and squeeze your shoulder blades together for 3 seconds. Make sure your elbows stay close to your body. Repeat 10 Times   Hold 2 Seconds   Complete 3 Sets   Perform 4 Times a Week          Paloff Press    - hold handle with both hands  - step out until tension is in the band  -  an athletic stance with knees slightly bent and feet shoulder width  apart  - keeping the handle in the center of your chest, begin to press the handle out, away form your body  - return handle back to the center of your chest  - repeat Repeat 10 Times   Hold 2 Seconds   Complete 3 Sets   Perform 4 Times a Week          Paloff Press Into Overhead Press with Pelvic Floor Coordination    In a lunge position, inhale/relax your PF, then exhale/contract your PF and push your arms straight out in front of you. Inhale/relax the PF and bring your arms back towards your body, exhale/contract the PF and push your arms straight overhead. Inhale and return to start.    Try to maintain the lunge position throughout the set. Do not arch your back to bring your arms overhead. Repeat 10 Times   Hold 2 Seconds   Complete 3 Sets   Perform 4 Times a Week          D[employer]scape Blue CARRY - KETTLEBELL - CRADDELED AT SHOULDER    Cradel a kettlebell at your shoulder then walk 20 feet, turn and then walk 20 feet back to starting point.    Maintain an erect straight spine.    Also, maintain tone in your shoulder to support the weight.    Then, perform using the other arm.    Video # [id number] Hold 2 Minutes   Complete 1 Set   Perform 4 Times a Week          Dreamscape Blue CARRY - KETTLEBELL    Walk 20 feet, turn and then walk 20 feet back to starting point while holding a weight such as a kettlebell or free weight at your side.    Maintain an erect and straight spine. Maintain tone in your shoulder to support the weight.    Perform using on one side and then perform on the other side arm.    Video # [id number] Hold 2 Minutes   Duration 10 Seconds   Complete 1 Set   Perform 4 Times a Week          Supine scapular retraction with ER isometric    laying on your back put a band or strap around your wrist. Squeeze your shoulder blades together without elevating at the shoulder. At the same time press your hands out while maintaining elbows at your side, hold for 5 seconds then slowly release. Repeat 10 Times   Hold 2 Seconds    Complete 2 Sets   Perform 4 Times a Week          ELASTIC BAND HORIZONTAL ABDUCTION    While holding an elastic band in front of you and elbows straight, pull the band outward away from your body as shown.    arms below 90* Repeat 10 Times   Hold 1 Second   Complete 3 Sets   Perform 4 Times a Week          External Rotation    Start with your arm horizontal to the ground as shown. Slowly bring back your hand while maintaining the horizontal position and hold. Then control the recoil of the band as your arm comes back to the beginning position. Repeat 10 Times   Hold 1 Second   Complete 2 Sets   Perform 4 Times a Day

## 2025-05-13 NOTE — PROGRESS NOTES
Physical Therapy Evaluation    Patient Name: Jose Ibrahim  MRN: [id number]  YOB: 1995  Encounter Date: 5/13/2025    Therapy Diagnosis:   Encounter Diagnoses   Name Primary?    Chronic right shoulder pain     Weakness     Decreased functional activity tolerance Yes    Need for home exercise program      Physician: Rodri Edge PA    Physician Orders: Eval and Treat  Medical Diagnosis: Subacromial bursitis of right shoulder joint    Visit # / Visits Authorized:  1 / 1  Insurance Authorization Period: 5/7/2025 to 12/31/2025  Date of Evaluation: 5/13/2025  Plan of Care Certification: 5/13/2025 to 06/24/2025 at 1x/wk x 6 weeks     Time In: 1258   Time Out: 1402  Total Time (in minutes): 64 minutes  Total Billable Time (in minutes): 60 minutes    Intake Outcome Measure for FOTO Survey    Therapist reviewed FOTO scores for Jose Ibrahim on 5/13/2025.   FOTO report - see Media section or FOTO account episode details.     Intake Score: 96%    Precautions: Extreme abduction with external rotation     Subjective   History of Present Illness  Jose is a 30 y.o. male who reports to physical therapy with a chief concern of Ongoing right shoulder pain with desired activities..     The patient reports a medical diagnosis of M75.51 (ICD-10-CM) - Subacromial bursitis of right shoulder joint. The patient has experienced this issue since 05/01/25.   Diagnostic tests related to this condition: X-ray.   X-Ray Details: XR SHOULDER COMPLETE 2 OR MORE VIEWS RIGHT     CLINICAL HISTORY:  Pain, unspecified     TECHNIQUE:  2 or more views of the shoulder are obtained.     COMPARISON:  None.     FINDINGS:  No dislocation is seen.  A fracture of the scapula, humerus or clavicle is not seen.  The acromioclavicular and glenohumeral joints are within normal limits.     Impression:     Negative shoulder radiographs.    Dominant Hand: Right  History of Present Condition/Illness: He reports a 4-5 month history of  right shoulder pain that will limit certain desired activities. He reports no mechanism of injury. He does have a history of going to the gym. He would perform the bench press and cable pulls and pectoralis flies, but his ability to perform them lately has decreased due to pain. He reports that he can not perform his push up routine because of the pain in his right shoulder from the position needed (abduction with external rotation). He reports that his pain is not immediate. He gave the example of if he is doing 3 sets of 10 at the gym his shoulder will not hurt until the end of the second set or the beginning of the third. He recently received an injection into the right shoulder. He is unsure if it has helped as he has continued to avoid the painful activities that he experienced before the injection. He leaves tonight for four weeks. He works on an oil rig. The M.D. and the patient wanted to send him to work with a good home program. When he returns, he is to continue PT. If there is no improvement, then an Magnetic Resonance Image will be ordered for him.      Activities of Daily Living      General Prior Level of Function Comments: He had no limitations.  General Current Level of Function Comments: He can function as needed, but he has been avoiding desired activities for fear of re-injury.  Patient Responsibilities: Community mobility, Driving, Financial management, Health management, Home management, Laundry, Meal prep, Personal ADL, Shopping, Yard work    Previously independent with activities of daily living? Yes     Currently independent with activities of daily living? Yes          Previously independent with instrumental activities of daily living? Yes     Currently independent with instrumental activities of daily living? Yes            Pain     Patient reports a current pain level of 0/10. Pain at best is reported as 0/10. Pain at worst is reported as 4/10.   Location: The right biceps tendon area and  a point specific spot on the posterior side of the glenohumeral joint.  Clinical Progression (since onset): Stable  Pain Qualities: Burning, Tightness, Tenderness  Pain-Relieving Factors: Activity modification, Ice, Heat, Stretching  Pain-Aggravating Factors: Lifting, Rotation         Living Arrangements  Living Situation  Housing: Home independently  Living Arrangements: Spouse/significant other  Support Systems: Family members, Friends/neighbors, Spouse/significant other        Employment  Patient does not report that: Does the patient's condition impact their ability to work?  Employment Status: Employed full-time   He works full time on an oil rig. He needs to be able to repetitively lift up to 50 pounds throughout the day.     Past Medical History/Physical Systems Review:   Jose Ibrahim  has a past medical history of Environmental allergies, RAD (reactive airway disease), and RAD (reactive airway disease).    Jose Ibrahim  has a past surgical history that includes Circumcision, primary.    Jose has a current medication list which includes the following prescription(s): albuterol, celecoxib, cetirizine, fluticasone propionate, fluticasone-umeclidin-vilanter, loratadine, and fish oil-omega-3 fatty acids, and the following Facility-Administered Medications: allergy mix and allergy mix.    Review of patient's allergies indicates:   Allergen Reactions    No known drug allergies         Objective   Posture        Shoulders are Rounded and Depressed.           Upper Extremity Sensation  General Upper Extremity Sensation  Intact: Right and Left  Right Upper Extremity Sensation  Intact: Light Touch, Sharp/Dull Discrimination, Kinesthesia, Proprioception, and Hot/Cold Discrimination  Right Upper Extremity Sensation Stocking Glove Pattern: No    Left Upper Extremity Sensation  Intact: Light Touch, Sharp/Dull Discrimination, Kinesthesia, Proprioception, and Hot/Cold Discrimination  Left Upper Extremity  Sensation Stocking Glove Pattern: No             Shoulder Strength - Planes of Motion   Right Strength Right Pain Left Strength Left  Pain   Flexion 4+   5     Extension 5   5     ABduction 4+ Yes 5     ADduction 5   5     Horizontal ABduction 4+   5     Horizontal ADduction           Internal Rotation 0° 4+   5     Internal Rotation 90°           External Rotation 0° 4+   5     External Rotation 90°               Elbow Strength   Right Strength Right Pain Left Strength Left  Pain   Flexion (C6) 5   5     Extension (C7) 5   5          Forearm Strength   Right Strength Right Pain Left Strength Left  Pain   Pronation 5   5     Supination 5   5                 Shoulder Special Tests  Shoulder Stability Tests  Negative: Right Anterior Drawer, Left Anterior Drawer, Right Apprehension, Left Apprehension, Right Posterior Drawer Positive, and Left Posterior Drawer Positive  Positive: Right Throckmorton's  Negative: Left Throckmorton's  Rotator Cuff Tests  Negative: Right Bear Hug, Left Bear Hug, Right Drop Arm, Left Drop Arm, Right Empty Can, and Left Empty Can  Impingement Tests  Negative: Right Neer's, Left Neer's, Right Painful Arc, and Left Painful Arc            Treatment:     Jose participated in dynamic functional therapeutic activities to improve functional performance for 25 minutes, including:  -external rotation with 90* abduction was shown at sub max external rotation and approximately 80* of abduction  -red banded horizontal abduction x 10 and green banded x 5  -isometric scapular retraction with elbows into the mat with 2 second hold x 10  -Farmer's carry with 20 pound kettle bell in right arm only x 2 minutes  -Schaefer's carry with 20 pound kettle bell carried bottom side up with right arm at 30* of forward flexion with elbow bent x 2 minutes  -Bilateral Green banded Paloff press with overhead reach to tolerance x 10 each direction  -green Banded bilateral scapular retraction with scapular adduction 2 x 10  -side lying  "external rotation, wall slides to overhead tolerance with a towel, trunk extension over a towel while in a chair, trunk extension over bolster laying on the ground, scapular wall clocks, side lying external rotation on the mat with a towel, sleeper stretch, and doorway stretch were put on his home program and gone over with him. Due to time constraints, they were not physically performed today. However, he stated that he was familiar with them from previous rehab to his left shoulder.    Time Entry(in minutes):  PT Evaluation (Low) Time Entry: 35  Therapeutic Activity Time Entry: 25    Assessment & Plan   Assessment  Jose presents with a condition of Low complexity.   Presentation of Symptoms: Stable  Will Comorbidities Impact Care: No       Functional Limitations: Activity tolerance, Carrying objects, Completing self-care activities, Completing work/school activities, Functional mobility, Pain when reaching, Participating in sports, Reaching  Impairments: Activity intolerance, Impaired physical strength, Lack of appropriate home exercise program, Pain with functional activity  Personal Factors Affecting Prognosis: Fear/anxiety, Motivation, Schedule, Physical limitations    Patient Goal for Therapy (PT): "If it doesn't stop hurting, I want to get some tests done to see what exactly is causing this ongoing pain."  Prognosis: Good  Assessment Details: He enters with chronic right shoulder pain and weakness. It is difficult to discern at this time if his complaints are possible labrum related. His functional ability is decreased. He needs patient education and a custom written home exercise program. He received his initial written home program this day. He was without questions.     Plan  From a physical therapy perspective, the patient would benefit from: Skilled Rehab Services    Planned therapy interventions include: Therapeutic exercise, Therapeutic activities, Neuromuscular re-education, and Manual therapy.  "   Planned modalities to include: Biofeedback, Cryotherapy (cold pack), Electrical stimulation - passive/unattended, Thermotherapy (hot pack), and Ultrasound.        Visit Frequency: 1 times Per Week for 6 Weeks.  Other/tapered frequency details: Will increase to 2x/wk if needed.    This plan was discussed with Patient.   Discussion participants: Agreed Upon Plan of Care         The patient's spiritual, cultural, and educational needs were considered. The patient was agreeable to the plan of care and its goals.       Goals:   Active       Long Term Goals:       1) Decrease his max right shoulder pain to 2/10 in order to improve his quality of life.        Start:  05/13/25    Expected End:  06/24/25            2) Increase his right shoulder strength a half grade in order to improve his overhead lifting ability.        Start:  05/13/25    Expected End:  06/24/25            3) Increase his FOTO function score to >=99% in order to improve his gym task ability.        Start:  05/13/25    Expected End:  06/24/25            4) Fair initial written home exercise program knowledge and be without questions in order to have carryover after discharge from PT.        Start:  05/13/25    Expected End:  06/24/25               Short Term Goals:        1) Decrease his max right shoulder pain to 3/10 in order to improve his quality of life.        Start:  05/13/25    Expected End:  06/03/25            2) Increase his right shoulder strength a half grade in order to improve his overhead lifting ability.        Start:  05/13/25    Expected End:  06/03/25            3) Increase his FOTO function score to >=97% in order to improve his gym task ability.        Start:  05/13/25    Expected End:  06/03/25            4) Fair initial written home exercise program knowledge and be without questions in order to have carryover after discharge from PT.        Start:  05/13/25    Expected End:  06/03/25                Nilay Zhao PT

## 2025-06-12 ENCOUNTER — OFFICE VISIT (OUTPATIENT)
Dept: ORTHOPEDICS | Facility: CLINIC | Age: 30
End: 2025-06-12
Payer: COMMERCIAL

## 2025-06-12 VITALS — BODY MASS INDEX: 25.49 KG/M2 | WEIGHT: 205 LBS | HEIGHT: 75 IN

## 2025-06-12 DIAGNOSIS — M75.51 SUBACROMIAL BURSITIS OF RIGHT SHOULDER JOINT: ICD-10-CM

## 2025-06-12 DIAGNOSIS — S43.431A SUPERIOR LABRUM ANTERIOR-TO-POSTERIOR (SLAP) TEAR OF RIGHT SHOULDER: Primary | ICD-10-CM

## 2025-06-12 PROCEDURE — 99213 OFFICE O/P EST LOW 20 MIN: CPT | Mod: S$GLB,,, | Performed by: ORTHOPAEDIC SURGERY

## 2025-06-12 PROCEDURE — 3008F BODY MASS INDEX DOCD: CPT | Mod: CPTII,S$GLB,, | Performed by: ORTHOPAEDIC SURGERY

## 2025-06-12 PROCEDURE — 1160F RVW MEDS BY RX/DR IN RCRD: CPT | Mod: CPTII,S$GLB,, | Performed by: ORTHOPAEDIC SURGERY

## 2025-06-12 PROCEDURE — 99999 PR PBB SHADOW E&M-EST. PATIENT-LVL III: CPT | Mod: PBBFAC,,, | Performed by: ORTHOPAEDIC SURGERY

## 2025-06-12 PROCEDURE — 1159F MED LIST DOCD IN RCRD: CPT | Mod: CPTII,S$GLB,, | Performed by: ORTHOPAEDIC SURGERY

## 2025-06-12 NOTE — PROGRESS NOTES
[facility]     Subjective:    Chief Complaint:   Chief Complaint   Patient presents with    Right Shoulder - Pain     Pain is about the same, started with PT but had to finish HEP which did help, would like to discuss getting scans        Past Medical History:   Diagnosis Date    Environmental allergies     RAD (reactive airway disease)     RAD (reactive airway disease)        Past Surgical History:   Procedure Laterality Date    CIRCUMCISION, PRIMARY         Current Outpatient Medications   Medication Sig    cetirizine (ZYRTEC) 10 MG tablet Take 1 tablet (10 mg total) by mouth once daily.    fluticasone propionate (FLONASE) 50 mcg/actuation nasal spray 2 sprays (100 mcg total) by Each Nostril route once daily.    fluticasone-umeclidin-vilanter (TRELEGY ELLIPTA) 100-62.5-25 mcg DsDv Inhale 1 puff into the lungs once daily.    omega-3 fatty acids/fish oil (FISH OIL-OMEGA-3 FATTY ACIDS) 300-1,000 mg capsule Take 2 g by mouth.    albuterol (PROVENTIL/VENTOLIN HFA) 90 mcg/actuation inhaler Inhale 1-2 puffs into the lungs every 4 (four) hours as needed for Wheezing or Shortness of Breath. 2 puffs every 4 hours as needed for cough, wheeze, or shortness of breath (Patient not taking: Reported on 5/7/2025)    celecoxib (CELEBREX) 100 MG capsule Take 1 capsule (100 mg total) by mouth 2 (two) times daily as needed for Pain. (Patient not taking: Reported on 6/12/2025)    loratadine (CLARITIN) 10 mg tablet Take 10 mg by mouth once daily. (Patient not taking: Reported on 6/12/2025)     Current Facility-Administered Medications   Medication    Allergy Mix    Allergy Mix       Review of patient's allergies indicates:   Allergen Reactions    No known drug allergies        Family History   Problem Relation Name Age of Onset    Allergic rhinitis Father Jose     Allergies Sister raul     Angioedema Neg Hx      Asthma Neg Hx      Atopy Neg Hx      Eczema Neg Hx      Immunodeficiency Neg Hx      Urticaria Neg Hx          Social History[1]    History of present illness:  30-year-old male following up for chronic right shoulder pain has been present for several months.  He last received a corticosteroid injection in the right shoulder about 6 weeks ago with only temporary relief of his right shoulder pain.  He has been performing home exercises to strengthen his right shoulder over the past 6 weeks. He states his only provides him temporary relief although he continues to experience painful range of motion of his right shoulder.  He exercises regularly in his unable to lift his regular weight is due to the right shoulder pain.  He denies any numbness or tingling throughout his right upper extremity.    Review of Systems:    Constitution: Negative for chills, fever, and sweats.  Negative for unexplained weight loss.    HENT:  Negative for headaches and blurry vision.    Cardiovascular:Negative for chest pain or irregular heart beat. Negative for hypertension.    Respiratory:  Negative for cough and shortness of breath.    Gastrointestinal: Negative for abdominal pain, heartburn, melena, nausea, and vomitting.    Genitourinary:  Negative bladder incontinence and dysuria.    Musculoskeletal:  See HPI for details.    Neurological: Negative for numbness.    Psychiatric/Behavioral: Negative for depression.  The patient is not nervous/anxious.      Endocrine: Negative for polyuria    Hematologic/Lymphatic: Negative for bleeding problem.  Does not bruise/bleed easily.    Skin: Negative for poor would healing and rash    Objective:     Physical Examination:    Vital Signs: VITALS@    Body mass index is 25.97 kg/m².    This a well-developed, well nourished patient in no acute distress.  They are alert and oriented and cooperative to examination.        Right shoulder exam: Skin overlying the right shoulder is clean dry and intact.  No erythema or ecchymosis.  No signs or symptoms of infection.  Full range of motion of the right shoulder  "with active flexion abduction 0-180 degrees.  Good strength of the rotator cuff muscles upon resistive testing with subjective increase in pain.  Negative speed's test.  Positive Maia's test.  Distally neurovascularly intact.    Pertinent New Results:    XRAY Report / Interpretation:  None    Assessment/Plan:     Superior labral tear of the right shoulder, chronic instability of the right shoulder.  He has been performing home exercises to strengthen his right shoulder and received an intra-articular corticosteroid injection of the subacromial space in his right shoulder about 6 weeks ago with only temporary relief of his right shoulder pain.  He states that his right shoulder pain significantly affects his ability to perform his regular activities.  I would like to order an MRI of his right shoulder to further assess the integrity of the rotator cuff muscles.  I would like him to follow up once the MRI has been performed to discuss the results as well as additional treatment options.     Miles Covarrubias, Physician Assistant, served in the capacity as a "scribe" for this patient encounter.  A "face-to-face" encounter occurred with Dr. Erick Spicer on this date.  The treatment plan and medical decision-making is outlined above. Patient was seen and examined with a chaperone.     This note was created using Dragon voice recognition software that occasionally misinterpreted phrases or words.       [1]   Social History  Socioeconomic History    Marital status: Single   Tobacco Use    Smoking status: Never   Substance and Sexual Activity    Alcohol use: No    Drug use: No   Social History Narrative    SOC. HX:  Lives w/ Mom, Dad, Sister. NO Smokers. + Pets -- 1 dog, 1 cat (Inside). 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        Allergic to cats dogs and dust.  Has received immune therapy to cats and dust.  Will begin receiving immune therapy to dog dander. Has cats and dogs at home but Family does not want to " eliminate that exposure.

## 2025-06-18 ENCOUNTER — TELEPHONE (OUTPATIENT)
Dept: ORTHOPEDICS | Facility: CLINIC | Age: 30
End: 2025-06-18
Payer: COMMERCIAL

## 2025-06-18 DIAGNOSIS — S43.431A SUPERIOR LABRUM ANTERIOR-TO-POSTERIOR (SLAP) TEAR OF RIGHT SHOULDER: Primary | ICD-10-CM

## 2025-06-18 NOTE — TELEPHONE ENCOUNTER
2nd part for arthrogram pended.    Detail Level: Simple Detail Level: Zone Aklief Pregnancy And Lactation Text: It is unknown if this medication is safe to use during pregnancy.  It is unknown if this medication is excreted in breast milk.  Breastfeeding women should use the topical cream on the smallest area of the skin for the shortest time needed while breastfeeding.  Do not apply to nipple and areola. Erythromycin Pregnancy And Lactation Text: This medication is Pregnancy Category B and is considered safe during pregnancy. It is also excreted in breast milk. Sarecycline Counseling: Patient advised regarding possible photosensitivity and discoloration of the teeth, skin, lips, tongue and gums.  Patient instructed to avoid sunlight, if possible.  When exposed to sunlight, patients should wear protective clothing, sunglasses, and sunscreen.  The patient was instructed to call the office immediately if the following severe adverse effects occur:  hearing changes, easy bruising/bleeding, severe headache, or vision changes.  The patient verbalized understanding of the proper use and possible adverse effects of sarecycline.  All of the patient's questions and concerns were addressed. Birth Control Pills Counseling: Birth Control Pill Counseling: I discussed with the patient the potential side effects of OCPs including but not limited to increased risk of stroke, heart attack, thrombophlebitis, deep venous thrombosis, hepatic adenomas, breast changes, GI upset, headaches, and depression.  The patient verbalized understanding of the proper use and possible adverse effects of OCPs. All of the patient's questions and concerns were addressed. Topical Clindamycin Counseling: Patient counseled that this medication may cause skin irritation or allergic reactions.  In the event of skin irritation, the patient was advised to reduce the amount of the drug applied or use it less frequently.   The patient verbalized understanding of the proper use and possible adverse effects of clindamycin.  All of the patient's questions and concerns were addressed. Tazorac Counseling:  Patient advised that medication is irritating and drying.  Patient may need to apply sparingly and wash off after an hour before eventually leaving it on overnight.  The patient verbalized understanding of the proper use and possible adverse effects of tazorac.  All of the patient's questions and concerns were addressed. Winlevi Pregnancy And Lactation Text: This medication is considered safe during pregnancy and breastfeeding. Bactrim Counseling:  I discussed with the patient the risks of sulfa antibiotics including but not limited to GI upset, allergic reaction, drug rash, diarrhea, dizziness, photosensitivity, and yeast infections.  Rarely, more serious reactions can occur including but not limited to aplastic anemia, agranulocytosis, methemoglobinemia, blood dyscrasias, liver or kidney failure, lung infiltrates or desquamative/blistering drug rashes. Doxycycline Pregnancy And Lactation Text: This medication is Pregnancy Category D and not consider safe during pregnancy. It is also excreted in breast milk but is considered safe for shorter treatment courses. Use Enhanced Medication Counseling?: No Minocycline Counseling: Patient advised regarding possible photosensitivity and discoloration of the teeth, skin, lips, tongue and gums.  Patient instructed to avoid sunlight, if possible.  When exposed to sunlight, patients should wear protective clothing, sunglasses, and sunscreen.  The patient was instructed to call the office immediately if the following severe adverse effects occur:  hearing changes, easy bruising/bleeding, severe headache, or vision changes.  The patient verbalized understanding of the proper use and possible adverse effects of minocycline.  All of the patient's questions and concerns were addressed. Topical Sulfur Applications Pregnancy And Lactation Text: This medication is Pregnancy Category C and has an unknown safety profile during pregnancy. It is unknown if this topical medication is excreted in breast milk. Topical Retinoid counseling:  Patient advised to apply a pea-sized amount only at bedtime and wait 30 minutes after washing their face before applying.  If too drying, patient may add a non-comedogenic moisturizer. The patient verbalized understanding of the proper use and possible adverse effects of retinoids.  All of the patient's questions and concerns were addressed. Tetracycline Pregnancy And Lactation Text: This medication is Pregnancy Category D and not consider safe during pregnancy. It is also excreted in breast milk. Spironolactone Pregnancy And Lactation Text: This medication can cause feminization of the male fetus and should be avoided during pregnancy. The active metabolite is also found in breast milk. High Dose Vitamin A Counseling: Side effects reviewed, pt to contact office should one occur. Azithromycin Counseling:  I discussed with the patient the risks of azithromycin including but not limited to GI upset, allergic reaction, drug rash, diarrhea, and yeast infections. Dapsone Pregnancy And Lactation Text: This medication is Pregnancy Category C and is not considered safe during pregnancy or breast feeding. Benzoyl Peroxide Counseling: Patient counseled that medicine may cause skin irritation and bleach clothing.  In the event of skin irritation, the patient was advised to reduce the amount of the drug applied or use it less frequently.   The patient verbalized understanding of the proper use and possible adverse effects of benzoyl peroxide.  All of the patient's questions and concerns were addressed. Topical Clindamycin Pregnancy And Lactation Text: This medication is Pregnancy Category B and is considered safe during pregnancy. It is unknown if it is excreted in breast milk. Birth Control Pills Pregnancy And Lactation Text: This medication should be avoided if pregnant and for the first 30 days post-partum. Tazorac Pregnancy And Lactation Text: This medication is not safe during pregnancy. It is unknown if this medication is excreted in breast milk. Isotretinoin Counseling: Patient should get monthly blood tests, not donate blood, not drive at night if vision affected, not share medication, and not undergo elective surgery for 6 months after tx completed. Side effects reviewed, pt to contact office should one occur. Azelaic Acid Counseling: Patient counseled that medicine may cause skin irritation and to avoid applying near the eyes.  In the event of skin irritation, the patient was advised to reduce the amount of the drug applied or use it less frequently.   The patient verbalized understanding of the proper use and possible adverse effects of azelaic acid.  All of the patient's questions and concerns were addressed. Bactrim Pregnancy And Lactation Text: This medication is Pregnancy Category D and is known to cause fetal risk.  It is also excreted in breast milk. Topical Retinoid Pregnancy And Lactation Text: This medication is Pregnancy Category C. It is unknown if this medication is excreted in breast milk. Winlevi Counseling:  I discussed with the patient the risks of topical clascoterone including but not limited to erythema, scaling, itching, and stinging. Patient voiced their understanding. Erythromycin Counseling:  I discussed with the patient the risks of erythromycin including but not limited to GI upset, allergic reaction, drug rash, diarrhea, increase in liver enzymes, and yeast infections. Aklief counseling:  Patient advised to apply a pea-sized amount only at bedtime and wait 30 minutes after washing their face before applying.  If too drying, patient may add a non-comedogenic moisturizer.  The most commonly reported side effects including irritation, redness, scaling, dryness, stinging, burning, itching, and increased risk of sunburn.  The patient verbalized understanding of the proper use and possible adverse effects of retinoids.  All of the patient's questions and concerns were addressed. Azithromycin Pregnancy And Lactation Text: This medication is considered safe during pregnancy and is also secreted in breast milk. Doxycycline Counseling:  Patient counseled regarding possible photosensitivity and increased risk for sunburn.  Patient instructed to avoid sunlight, if possible.  When exposed to sunlight, patients should wear protective clothing, sunglasses, and sunscreen.  The patient was instructed to call the office immediately if the following severe adverse effects occur:  hearing changes, easy bruising/bleeding, severe headache, or vision changes.  The patient verbalized understanding of the proper use and possible adverse effects of doxycycline.  All of the patient's questions and concerns were addressed. High Dose Vitamin A Pregnancy And Lactation Text: High dose vitamin A therapy is contraindicated during pregnancy and breast feeding. Tetracycline Counseling: Patient counseled regarding possible photosensitivity and increased risk for sunburn.  Patient instructed to avoid sunlight, if possible.  When exposed to sunlight, patients should wear protective clothing, sunglasses, and sunscreen.  The patient was instructed to call the office immediately if the following severe adverse effects occur:  hearing changes, easy bruising/bleeding, severe headache, or vision changes.  The patient verbalized understanding of the proper use and possible adverse effects of tetracycline.  All of the patient's questions and concerns were addressed. Patient understands to avoid pregnancy while on therapy due to potential birth defects. Azelaic Acid Pregnancy And Lactation Text: This medication is considered safe during pregnancy and breast feeding. Topical Sulfur Applications Counseling: Topical Sulfur Counseling: Patient counseled that this medication may cause skin irritation or allergic reactions.  In the event of skin irritation, the patient was advised to reduce the amount of the drug applied or use it less frequently.   The patient verbalized understanding of the proper use and possible adverse effects of topical sulfur application.  All of the patient's questions and concerns were addressed. Benzoyl Peroxide Pregnancy And Lactation Text: This medication is Pregnancy Category C. It is unknown if benzoyl peroxide is excreted in breast milk. Isotretinoin Pregnancy And Lactation Text: This medication is Pregnancy Category X and is considered extremely dangerous during pregnancy. It is unknown if it is excreted in breast milk. Spironolactone Counseling: Patient advised regarding risks of diarrhea, abdominal pain, hyperkalemia, birth defects (for female patients), liver toxicity and renal toxicity. The patient may need blood work to monitor liver and kidney function and potassium levels while on therapy. The patient verbalized understanding of the proper use and possible adverse effects of spironolactone.  All of the patient's questions and concerns were addressed. Dapsone Counseling: I discussed with the patient the risks of dapsone including but not limited to hemolytic anemia, agranulocytosis, rashes, methemoglobinemia, kidney failure, peripheral neuropathy, headaches, GI upset, and liver toxicity.  Patients who start dapsone require monitoring including baseline LFTs and weekly CBCs for the first month, then every month thereafter.  The patient verbalized understanding of the proper use and possible adverse effects of dapsone.  All of the patient's questions and concerns were addressed.

## 2025-07-14 ENCOUNTER — HOSPITAL ENCOUNTER (OUTPATIENT)
Dept: RADIOLOGY | Facility: HOSPITAL | Age: 30
Discharge: HOME | End: 2025-07-14
Payer: COMMERCIAL

## 2025-07-14 ENCOUNTER — HOSPITAL ENCOUNTER (OUTPATIENT)
Dept: RADIOLOGY | Facility: HOSPITAL | Age: 30
Discharge: HOME | End: 2025-07-14
Attending: ORTHOPAEDIC SURGERY
Payer: COMMERCIAL

## 2025-07-14 DIAGNOSIS — S43.431A SUPERIOR LABRUM ANTERIOR-TO-POSTERIOR (SLAP) TEAR OF RIGHT SHOULDER: ICD-10-CM

## 2025-07-14 PROCEDURE — A9577 INJ MULTIHANCE: HCPCS

## 2025-07-14 PROCEDURE — 73040 CONTRAST X-RAY OF SHOULDER: CPT | Mod: 26,RT,, | Performed by: RADIOLOGY

## 2025-07-14 PROCEDURE — 23350 INJECTION FOR SHOULDER X-RAY: CPT | Mod: RT

## 2025-07-14 PROCEDURE — 73222 MRI JOINT UPR EXTREM W/DYE: CPT | Mod: TC,RT

## 2025-07-14 PROCEDURE — 23350 INJECTION FOR SHOULDER X-RAY: CPT | Mod: RT,,, | Performed by: RADIOLOGY

## 2025-07-14 PROCEDURE — 25500020 PHARM REV CODE 255

## 2025-07-14 PROCEDURE — 73222 MRI JOINT UPR EXTREM W/DYE: CPT | Mod: 26,RT,, | Performed by: RADIOLOGY

## 2025-07-14 RX ORDER — LIDOCAINE HYDROCHLORIDE 10 MG/ML
INJECTION, SOLUTION EPIDURAL; INFILTRATION; INTRACAUDAL; PERINEURAL
Status: DISCONTINUED
Start: 2025-07-14 | End: 2025-07-14 | Stop reason: WASHOUT

## 2025-07-14 RX ADMIN — IOHEXOL 50 ML: 350 INJECTION, SOLUTION INTRAVENOUS at 11:07

## 2025-07-14 RX ADMIN — GADOBENATE DIMEGLUMINE 15 ML: 529 INJECTION, SOLUTION INTRAVENOUS at 11:07

## 2025-07-17 ENCOUNTER — OFFICE VISIT (OUTPATIENT)
Dept: ORTHOPEDICS | Facility: CLINIC | Age: 30
End: 2025-07-17
Payer: COMMERCIAL

## 2025-07-17 VITALS — WEIGHT: 205 LBS | BODY MASS INDEX: 25.49 KG/M2 | HEIGHT: 75 IN

## 2025-07-17 DIAGNOSIS — M25.311 MULTIDIRECTIONAL INSTABILITY OF RIGHT GLENOHUMERAL JOINT: Primary | ICD-10-CM

## 2025-07-17 DIAGNOSIS — S43.431A SUPERIOR LABRUM ANTERIOR-TO-POSTERIOR (SLAP) TEAR OF RIGHT SHOULDER: ICD-10-CM

## 2025-07-17 PROCEDURE — 99999 PR PBB SHADOW E&M-EST. PATIENT-LVL III: CPT | Mod: PBBFAC,,, | Performed by: ORTHOPAEDIC SURGERY

## 2025-07-17 PROCEDURE — 99213 OFFICE O/P EST LOW 20 MIN: CPT | Mod: S$GLB,,, | Performed by: ORTHOPAEDIC SURGERY

## 2025-07-17 PROCEDURE — 3008F BODY MASS INDEX DOCD: CPT | Mod: CPTII,S$GLB,, | Performed by: ORTHOPAEDIC SURGERY

## 2025-07-17 PROCEDURE — 1160F RVW MEDS BY RX/DR IN RCRD: CPT | Mod: CPTII,S$GLB,, | Performed by: ORTHOPAEDIC SURGERY

## 2025-07-17 PROCEDURE — 1159F MED LIST DOCD IN RCRD: CPT | Mod: CPTII,S$GLB,, | Performed by: ORTHOPAEDIC SURGERY

## 2025-07-17 NOTE — PROGRESS NOTES
[facility]     Subjective:    Chief Complaint:   Chief Complaint   Patient presents with    Right Shoulder - Pain     Right Shoulder MRI Arthrogram results. States no changes to previous appt. Pain dependent on activity level, rest with relief.        Past Medical History:   Diagnosis Date    Environmental allergies     RAD (reactive airway disease)     RAD (reactive airway disease)        Past Surgical History:   Procedure Laterality Date    CIRCUMCISION, PRIMARY         Current Outpatient Medications   Medication Sig    cetirizine (ZYRTEC) 10 MG tablet Take 1 tablet (10 mg total) by mouth once daily.    omega-3 fatty acids/fish oil (FISH OIL-OMEGA-3 FATTY ACIDS) 300-1,000 mg capsule Take 2 g by mouth.    albuterol (PROVENTIL/VENTOLIN HFA) 90 mcg/actuation inhaler Inhale 1-2 puffs into the lungs every 4 (four) hours as needed for Wheezing or Shortness of Breath. 2 puffs every 4 hours as needed for cough, wheeze, or shortness of breath    celecoxib (CELEBREX) 100 MG capsule Take 1 capsule (100 mg total) by mouth 2 (two) times daily as needed for Pain.    fluticasone propionate (FLONASE) 50 mcg/actuation nasal spray 2 sprays (100 mcg total) by Each Nostril route once daily. (Patient not taking: Reported on 7/17/2025)    fluticasone-umeclidin-vilanter (TRELEGY ELLIPTA) 100-62.5-25 mcg DsDv Inhale 1 puff into the lungs once daily. (Patient not taking: Reported on 7/17/2025)    loratadine (CLARITIN) 10 mg tablet Take 10 mg by mouth once daily. (Patient not taking: Reported on 5/7/2025)     Current Facility-Administered Medications   Medication    Allergy Mix    Allergy Mix       Review of patient's allergies indicates:   Allergen Reactions    No known drug allergies        Family History   Problem Relation Name Age of Onset    Allergic rhinitis Father Jose     Allergies Sister raul     Angioedema Neg Hx      Asthma Neg Hx      Atopy Neg Hx      Eczema Neg Hx      Immunodeficiency Neg Hx      Urticaria Neg  Hx         Social History[1]    History of present illness:  30-year-old male presenting with chronic right shoulder pain that has been present for several months.  Here to discuss recent MRI arthrogram results of the right shoulder.  For review, he last received a corticosteroid injection in the right shoulder about 2 months with only temporary relief of his right shoulder pain.  He has been performing home exercises to strengthen his right shoulder over the past few months. He states this only provides him temporary relief although he continues to experience painful range of motion of his right shoulder.  He does have a history of right shoulder dislocation.  He exercises regularly in his unable to lift his regular weight is due to the right shoulder pain.  He denies any numbness or tingling throughout his right upper extremity.     Review of Systems:    Constitution: Negative for chills, fever, and sweats.  Negative for unexplained weight loss.    HENT:  Negative for headaches and blurry vision.    Cardiovascular:Negative for chest pain or irregular heart beat. Negative for hypertension.    Respiratory:  Negative for cough and shortness of breath.    Gastrointestinal: Negative for abdominal pain, heartburn, melena, nausea, and vomitting.    Genitourinary:  Negative bladder incontinence and dysuria.    Musculoskeletal:  See HPI for details.    Neurological: Negative for numbness.    Psychiatric/Behavioral: Negative for depression.  The patient is not nervous/anxious.      Endocrine: Negative for polyuria    Hematologic/Lymphatic: Negative for bleeding problem.  Does not bruise/bleed easily.    Skin: Negative for poor would healing and rash    Objective:     Physical Examination:    Vital Signs: VITALS@    Body mass index is 25.97 kg/m².    This a well-developed, well nourished patient in no acute distress.  They are alert and oriented and cooperative to examination.        Right shoulder exam:Skin overlying the  "right shoulder is clean dry and intact. No erythema or ecchymosis. No signs or symptoms of infection. Full range of motion of the right shoulder with active flexion abduction 0-180 degrees.  He has a reproducible palpable click in his right shoulder with flexion at about 160°.  He has significant laxity and multidirectional instability of his right shoulder exam.  Good strength of the rotator cuff muscles upon resistive testing with subjective increase in pain. Negative speed's test. Positive Maia's test. Distally neurovascularly intact.     Pertinent New Results:    XRAY Report / Interpretation:  Recent MRI arthrogram results of the right shoulder reveal suspect glenoid labral tear involving the anterosuperior and posterosuperior quadrants.    Assessment/Plan:     Multidirectional instability of the right shoulder, SLAP tear of the right shoulder.  We discussed his treatment options today whether it be proceeding with right shoulder arthroscopy versus continued rotator cuff strengthening with formal physical therapy.  We would like to continue additional strengthening with a home exercise program for the next few months before proceeding with surgical intervention.  I would like to send him to formal physical therapy for instructions of a home exercise program for multidirectional instability of the right shoulder.  I would like her to follow up in 3 months to reassess his right shoulder symptoms.    Miles Covarrubias, Physician Assistant, served in the capacity as a "scribe" for this patient encounter.  A "face-to-face" encounter occurred with Dr. Erick Spicer on this date.  The treatment plan and medical decision-making is outlined above. Patient was seen and examined with a chaperone.     This note was created using Dragon voice recognition software that occasionally misinterpreted phrases or words.         [1]   Social History  Socioeconomic History    Marital status: Single   Tobacco Use    Smoking status: Never "   Substance and Sexual Activity    Alcohol use: No    Drug use: No   Social History Narrative    SOC. HX:  Lives w/ Mom, Dad, Sister. NO Smokers. + Pets -- 1 dog, 1 cat (Inside). 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        Allergic to cats dogs and dust.  Has received immune therapy to cats and dust.  Will begin receiving immune therapy to dog dander. Has cats and dogs at home but Family does not want to eliminate that exposure.

## 2025-07-21 PROBLEM — M25.311 MULTIDIRECTIONAL INSTABILITY OF RIGHT GLENOHUMERAL JOINT: Status: ACTIVE | Noted: 2025-07-21

## 2025-07-22 ENCOUNTER — CLINICAL SUPPORT (OUTPATIENT)
Dept: REHABILITATION | Facility: HOSPITAL | Age: 30
End: 2025-07-22
Payer: COMMERCIAL

## 2025-07-22 DIAGNOSIS — M25.311 MULTIDIRECTIONAL INSTABILITY OF RIGHT GLENOHUMERAL JOINT: Primary | ICD-10-CM

## 2025-07-22 DIAGNOSIS — R29.3 POSTURE ABNORMALITY: ICD-10-CM

## 2025-07-22 DIAGNOSIS — R29.898 DECREASED STRENGTH OF UPPER EXTREMITY: ICD-10-CM

## 2025-07-22 PROCEDURE — 97530 THERAPEUTIC ACTIVITIES: CPT | Mod: PN | Performed by: PHYSICAL THERAPIST

## 2025-07-22 PROCEDURE — 97161 PT EVAL LOW COMPLEX 20 MIN: CPT | Mod: PN | Performed by: PHYSICAL THERAPIST

## 2025-07-22 NOTE — PROGRESS NOTES
Outpatient Rehab    Physical Therapy Evaluation (only)    Patient Name: Jose Ibrahim  MRN: [id number]  YOB: 1995  Encounter Date: 7/22/2025    Therapy Diagnosis:   Encounter Diagnoses   Name Primary?    Multidirectional instability of right glenohumeral joint Yes    Decreased strength of upper extremity     Posture abnormality      Physician: Erick Spicer MD    Physician Orders: Eval and Treat  Medical Diagnosis: Multidirectional instability of right glenohumeral joint  Surgical Diagnosis: Not applicable for this Episode  Surgical Date: Not applicable for this Episode  Days Since Last Surgery: Not applicable for this Episode    Visit # / Visits Authorized:  1 / 1  Insurance Authorization Period: 7/17/2025 to 12/31/2025  Date of Evaluation: 7/22/2025  Plan of Care Certification: 7/22/2025 to 10/14/2025     Time In: 1100   Time Out: 1200  Total Time (in minutes): 60   Total Billable Time (in minutes): 1200    Intake Outcome Measure for FOTO Survey    Therapist reviewed FOTO scores for Jose Ibrahim on 7/22/2025.   FOTO report - see Media section or FOTO account episode details.     Intake Score (%): 66    Precautions:       Subjective   History of Present Illness  Jose is a 30 y.o. male who reports to physical therapy with a chief concern of Right shoulder pain.     The patient reports a medical diagnosis of Multidirectional instability of right glenohumeral joint. The patient has experienced this issue since 07/17/25.   Diagnostic tests related to this condition: MRI studies.   MRI Studies Details: Suspect glenoid labral tear involving the anterosuperior and posterosuperior quadrants.    Dominant Hand: Right  History of Present Condition/Illness: The patient reports a 1 year history of right shoulder pain. He reports his pain is increased with lifting weights and when loading his shoulder. He notes slight discomfort with overhead activities. When he is lifting weights, he reports his  pain is increased with chest press, bench press & overhead press. The patient is going out of town for a month and is looking for so exercises to perform [place].    Pain     Patient reports a current pain level of 0/10. Pain at best is reported as 0/10. Pain at worst is reported as 4/10.   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    Jose Ibrahim  has a past surgical history that includes Circumcision, primary.    Jose has a current medication list which includes the following prescription(s): albuterol, celecoxib, cetirizine, fluticasone propionate, fluticasone-umeclidin-vilanter, loratadine, and fish oil-omega-3 fatty acids, and the following Facility-Administered Medications: allergy mix and allergy mix.    Review of patient's allergies indicates:   Allergen Reactions    No known drug allergies         Objective   Posture  Patient presents with a Forward head position. Flat lumbar spine and Increased thoracic kyphosis is observed.   Shoulders are Rounded. Bilateral scapulae are: Protracted              Shoulder Range of Motion  Right Shoulder   Active (deg) Passive (deg) Pain   Flexion 172       Extension         Scaption         ABduction 178       ADduction         Horizontal ABduction         Horizontal ADduction         External Rotation (Shoulder ABducted 0 degrees)         External Rotation (Shoulder ABducted 45 degrees)          External Rotation (Shoulder ABducted 90 degrees) 90       Internal Rotation (Shoulder ABducted 0 degrees)         Internal Rotation (Shoulder ABducted 45 degrees)         Internal Rotation (Shoulder ABducted 90 degrees)           Left Shoulder   Active (deg) Passive (deg) Pain   Flexion 176       Extension         Scaption         ABduction 178       ADduction         Horizontal ABduction         Horizontal ADduction         External Rotation (Shoulder ABducted 0 degrees)         External Rotation (Shoulder ABducted 45 degrees)         External Rotation (Shoulder ABducted 90 degrees) 88       Internal Rotation (Shoulder ABducted 0 degrees)         Internal Rotation (Shoulder ABducted 45 degrees)         Internal Rotation (Shoulder ABducted 90 degrees)                       Shoulder Strength - Planes of Motion   Right Strength Right Pain Left Strength Left  Pain   Flexion 5   5     Extension           ABduction 5   5     ADduction           Horizontal ABduction           Horizontal ADduction           Internal Rotation 0°           Internal Rotation 90°           External Rotation 0° 4   4     External Rotation 90°                         Shoulder Special Tests  Shoulder Stability Tests  Positive: Right Anterior Load and Shift  Negative: Left Anterior Load and Shift  Positive: Right Tasley's  Negative: Left Tasley's  Rotator Cuff Tests  Negative: Right Drop Arm, Left Drop Arm, Right Empty Can, and Left Empty Can  Negative: Right Lift Off and Left Lift Off  Impingement Tests  Positive: Right Elizabeth-Kumar and Right Neer's  Negative: Left Elizabeth-Kumar and Left Neer's            Treatment:    THERAPEUTIC ACTIVITIES to improve dynamic and functional performance for 30 minutes including :.    Shoulder extension with Blue theraband  3 x 10  Shoulder adduction with Blue theraband  3 x 10  Mid rows with Blue theraband  3 x 10  Ball on the wall at 90* shoulder flexion 3 x 10 cw/ccw  Serratus flexion on the wall with  red looped band 3 x 10   Prone Y on Physioball 3 x 10  Prone T on Physioball 3 x 10  Prone extension on Physioball 3 x 10    Time Entry(in minutes):  PT Evaluation (Low) Time Entry: 30  Therapeutic Activity Time Entry: 30    Assessment & Plan   Assessment  Jose presents with a condition of Low complexity.   Presentation of Symptoms: Stable       Functional Limitations: Activity tolerance, Functional mobility  Impairments: Activity intolerance, Impaired physical strength, Pain with functional activity  Personal Factors Affecting Prognosis: Physical limitations, Pain    Patient Goal for Therapy (PT): Return to previous level of activity  Prognosis: Good  Assessment Details: The patient is a 30 year old male with complaints of right shoulder pain. He presents with: decreased posterior shoulder strength, right shoulder instability & forward shoulder posture. He will benefit from skilled PT to increase posterior shoulder strength, improve shoulder stability & return to previous level of activity.     Plan  From a physical therapy perspective, the patient would benefit from: Skilled Rehab Services    Planned therapy interventions include: Neuromuscular re-education, Therapeutic activities, Therapeutic exercise, and Manual therapy.            Visit Frequency: 2 times Per Week for 6 Weeks.       This plan was discussed with Patient.   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 Posterior shoulder strengthening, shoulder stabilization          The patient's spiritual, cultural, and educational needs were considered, and the patient is agreeable to the plan of care and goals.           Goals:   Active       A. STG       The patient will begin a written home exercise program        Start:  07/22/25    Expected End:  09/16/25            Decrease pain at worst to 2/10        Start:  07/22/25     Expected End:  09/16/25            Increase right shoulder strength to 4+/5 external rotation         Start:  07/22/25    Expected End:  09/16/25            Increase the patients FOTO score to 75        Start:  07/22/25    Expected End:  09/16/25               BEBETO SCHAFER       The patient will be independent with a home exercise program        Start:  07/22/25    Expected End:  10/14/25            Decrease pain at worst to 1/10        Start:  07/22/25    Expected End:  10/14/25            Increase right shoulder strength to 5/5 flexion, 5/5 abduction, 5/5 external rotation         Start:  07/22/25    Expected End:  10/14/25            Increase the patients FOTO score to 81        Start:  07/22/25    Expected End:  10/14/25                Maldonado Caruso, MS, PT, ATC